# Patient Record
Sex: FEMALE | Race: WHITE | NOT HISPANIC OR LATINO | URBAN - METROPOLITAN AREA
[De-identification: names, ages, dates, MRNs, and addresses within clinical notes are randomized per-mention and may not be internally consistent; named-entity substitution may affect disease eponyms.]

---

## 2018-03-28 ENCOUNTER — OUTPATIENT (OUTPATIENT)
Dept: OUTPATIENT SERVICES | Facility: HOSPITAL | Age: 58
LOS: 1 days | End: 2018-03-28
Payer: COMMERCIAL

## 2018-03-28 DIAGNOSIS — Z01.818 ENCOUNTER FOR OTHER PREPROCEDURAL EXAMINATION: ICD-10-CM

## 2018-03-28 DIAGNOSIS — Z98.89 OTHER SPECIFIED POSTPROCEDURAL STATES: Chronic | ICD-10-CM

## 2018-03-28 DIAGNOSIS — Z90.710 ACQUIRED ABSENCE OF BOTH CERVIX AND UTERUS: Chronic | ICD-10-CM

## 2018-03-28 LAB
ANION GAP SERPL CALC-SCNC: 13 MMOL/L — SIGNIFICANT CHANGE UP (ref 5–17)
BASOPHILS # BLD AUTO: 0 K/UL — SIGNIFICANT CHANGE UP (ref 0–0.2)
BASOPHILS NFR BLD AUTO: 0.8 % — SIGNIFICANT CHANGE UP (ref 0–2)
BUN SERPL-MCNC: 11 MG/DL — SIGNIFICANT CHANGE UP (ref 8–20)
CALCIUM SERPL-MCNC: 9.5 MG/DL — SIGNIFICANT CHANGE UP (ref 8.6–10.2)
CHLORIDE SERPL-SCNC: 103 MMOL/L — SIGNIFICANT CHANGE UP (ref 98–107)
CO2 SERPL-SCNC: 24 MMOL/L — SIGNIFICANT CHANGE UP (ref 22–29)
CREAT SERPL-MCNC: 0.52 MG/DL — SIGNIFICANT CHANGE UP (ref 0.5–1.3)
EOSINOPHIL # BLD AUTO: 0.2 K/UL — SIGNIFICANT CHANGE UP (ref 0–0.5)
EOSINOPHIL NFR BLD AUTO: 3 % — SIGNIFICANT CHANGE UP (ref 0–6)
GLUCOSE SERPL-MCNC: 80 MG/DL — SIGNIFICANT CHANGE UP (ref 70–115)
HCT VFR BLD CALC: 40.4 % — SIGNIFICANT CHANGE UP (ref 37–47)
HGB BLD-MCNC: 13.6 G/DL — SIGNIFICANT CHANGE UP (ref 12–16)
LYMPHOCYTES # BLD AUTO: 2.4 K/UL — SIGNIFICANT CHANGE UP (ref 1–4.8)
LYMPHOCYTES # BLD AUTO: 40.3 % — SIGNIFICANT CHANGE UP (ref 20–55)
MCHC RBC-ENTMCNC: 30.9 PG — SIGNIFICANT CHANGE UP (ref 27–31)
MCHC RBC-ENTMCNC: 33.7 G/DL — SIGNIFICANT CHANGE UP (ref 32–36)
MCV RBC AUTO: 91.8 FL — SIGNIFICANT CHANGE UP (ref 81–99)
MONOCYTES # BLD AUTO: 0.5 K/UL — SIGNIFICANT CHANGE UP (ref 0–0.8)
MONOCYTES NFR BLD AUTO: 8.6 % — SIGNIFICANT CHANGE UP (ref 3–10)
NEUTROPHILS # BLD AUTO: 2.8 K/UL — SIGNIFICANT CHANGE UP (ref 1.8–8)
NEUTROPHILS NFR BLD AUTO: 47.1 % — SIGNIFICANT CHANGE UP (ref 37–73)
PLATELET # BLD AUTO: 271 K/UL — SIGNIFICANT CHANGE UP (ref 150–400)
POTASSIUM SERPL-MCNC: 4.1 MMOL/L — SIGNIFICANT CHANGE UP (ref 3.5–5.3)
POTASSIUM SERPL-SCNC: 4.1 MMOL/L — SIGNIFICANT CHANGE UP (ref 3.5–5.3)
RBC # BLD: 4.4 M/UL — SIGNIFICANT CHANGE UP (ref 4.4–5.2)
RBC # FLD: 13.2 % — SIGNIFICANT CHANGE UP (ref 11–15.6)
SODIUM SERPL-SCNC: 140 MMOL/L — SIGNIFICANT CHANGE UP (ref 135–145)
WBC # BLD: 6 K/UL — SIGNIFICANT CHANGE UP (ref 4.8–10.8)
WBC # FLD AUTO: 6 K/UL — SIGNIFICANT CHANGE UP (ref 4.8–10.8)

## 2018-03-28 PROCEDURE — 36415 COLL VENOUS BLD VENIPUNCTURE: CPT

## 2018-03-28 PROCEDURE — 93010 ELECTROCARDIOGRAM REPORT: CPT

## 2018-03-28 PROCEDURE — 85027 COMPLETE CBC AUTOMATED: CPT

## 2018-03-28 PROCEDURE — 93005 ELECTROCARDIOGRAM TRACING: CPT

## 2018-03-28 PROCEDURE — 80048 BASIC METABOLIC PNL TOTAL CA: CPT

## 2018-03-28 PROCEDURE — G0463: CPT

## 2018-04-05 ENCOUNTER — RESULT REVIEW (OUTPATIENT)
Age: 58
End: 2018-04-05

## 2018-09-17 ENCOUNTER — APPOINTMENT (OUTPATIENT)
Dept: PULMONOLOGY | Facility: CLINIC | Age: 58
End: 2018-09-17
Payer: COMMERCIAL

## 2018-09-17 VITALS
HEIGHT: 61 IN | SYSTOLIC BLOOD PRESSURE: 124 MMHG | BODY MASS INDEX: 40.59 KG/M2 | WEIGHT: 215 LBS | DIASTOLIC BLOOD PRESSURE: 80 MMHG | OXYGEN SATURATION: 96 % | RESPIRATION RATE: 16 BRPM | HEART RATE: 83 BPM

## 2018-09-17 DIAGNOSIS — Z87.891 PERSONAL HISTORY OF NICOTINE DEPENDENCE: ICD-10-CM

## 2018-09-17 PROCEDURE — 99205 OFFICE O/P NEW HI 60 MIN: CPT

## 2018-09-27 ENCOUNTER — OUTPATIENT (OUTPATIENT)
Dept: OUTPATIENT SERVICES | Facility: HOSPITAL | Age: 58
LOS: 1 days | End: 2018-09-27
Payer: COMMERCIAL

## 2018-09-27 DIAGNOSIS — Z90.710 ACQUIRED ABSENCE OF BOTH CERVIX AND UTERUS: Chronic | ICD-10-CM

## 2018-09-27 DIAGNOSIS — G47.33 OBSTRUCTIVE SLEEP APNEA (ADULT) (PEDIATRIC): ICD-10-CM

## 2018-09-27 DIAGNOSIS — Z98.89 OTHER SPECIFIED POSTPROCEDURAL STATES: Chronic | ICD-10-CM

## 2018-09-27 PROCEDURE — 95806 SLEEP STUDY UNATT&RESP EFFT: CPT

## 2018-09-27 PROCEDURE — 95806 SLEEP STUDY UNATT&RESP EFFT: CPT | Mod: 26

## 2018-09-27 PROCEDURE — G0399: CPT

## 2018-10-09 ENCOUNTER — APPOINTMENT (OUTPATIENT)
Dept: PULMONOLOGY | Facility: CLINIC | Age: 58
End: 2018-10-09
Payer: COMMERCIAL

## 2018-10-09 VITALS — SYSTOLIC BLOOD PRESSURE: 122 MMHG | WEIGHT: 215 LBS | BODY MASS INDEX: 40.62 KG/M2 | DIASTOLIC BLOOD PRESSURE: 66 MMHG

## 2018-10-09 VITALS — HEART RATE: 68 BPM | OXYGEN SATURATION: 98 %

## 2018-10-09 VITALS — RESPIRATION RATE: 16 BRPM

## 2018-10-09 PROCEDURE — 99214 OFFICE O/P EST MOD 30 MIN: CPT

## 2018-10-09 RX ORDER — DOXYLAMINE SUCCINATE 25 MG
TABLET ORAL
Refills: 0 | Status: ACTIVE | COMMUNITY

## 2019-01-17 ENCOUNTER — APPOINTMENT (OUTPATIENT)
Dept: PULMONOLOGY | Facility: CLINIC | Age: 59
End: 2019-01-17
Payer: COMMERCIAL

## 2019-01-17 VITALS — WEIGHT: 216 LBS | HEIGHT: 61 IN | BODY MASS INDEX: 40.78 KG/M2

## 2019-01-17 VITALS — OXYGEN SATURATION: 98 % | DIASTOLIC BLOOD PRESSURE: 78 MMHG | SYSTOLIC BLOOD PRESSURE: 122 MMHG | HEART RATE: 68 BPM

## 2019-01-17 PROCEDURE — 99214 OFFICE O/P EST MOD 30 MIN: CPT

## 2019-01-17 NOTE — ASSESSMENT
[FreeTextEntry1] : Severe obstructive sleep apnea with excellent compliance and benefit from CPAP. Supplies renewed. Followup one year.

## 2019-01-17 NOTE — PHYSICAL EXAM
[Normal Conjunctiva] : the conjunctiva exhibited no abnormalities [Eyelids - No Xanthelasma] : the eyelids demonstrated no xanthelasmas [IV] : IV [FreeTextEntry1] : Facial asymmetry with palatal and tongue asymmetry as well. [Neck Appearance] : the appearance of the neck was normal [Neck Cervical Mass (___cm)] : no neck mass was observed [Jugular Venous Distention Increased] : there was no jugular-venous distention [Thyroid Diffuse Enlargement] : the thyroid was not enlarged [Thyroid Nodule] : there were no palpable thyroid nodules [Heart Rate And Rhythm] : heart rate and rhythm were normal [Heart Sounds] : normal S1 and S2 [Murmurs] : no murmurs present [Respiration, Rhythm And Depth] : normal respiratory rhythm and effort [Exaggerated Use Of Accessory Muscles For Inspiration] : no accessory muscle use [Auscultation Breath Sounds / Voice Sounds] : lungs were clear to auscultation bilaterally [Abdomen Soft] : soft [Abdomen Tenderness] : non-tender [Abdomen Mass (___ Cm)] : no abdominal mass palpated [Abnormal Walk] : normal gait [Gait - Sufficient For Exercise Testing] : the gait was sufficient for exercise testing [Nail Clubbing] : no clubbing of the fingernails [Cyanosis, Localized] : no localized cyanosis [Petechial Hemorrhages (___cm)] : no petechial hemorrhages [Deep Tendon Reflexes (DTR)] : deep tendon reflexes were 2+ and symmetric [Sensation] : the sensory exam was normal to light touch and pinprick [No Focal Deficits] : no focal deficits [Oriented To Time, Place, And Person] : oriented to person, place, and time [Impaired Insight] : insight and judgment were intact [Affect] : the affect was normal [Skin Color & Pigmentation] : normal skin color and pigmentation [Skin Turgor] : normal skin turgor [] : no rash

## 2019-01-17 NOTE — HISTORY OF PRESENT ILLNESS
[FreeTextEntry1] : Patient got her new machine. She is faithful with it and reports continuing to do well with improved alertness. Compliance is excellent. She does use greater than 4 hours on 97% of the night. She is averaging 8-1/4 hours of use per night. Residual AHI of 0.5.

## 2019-06-11 ENCOUNTER — EMERGENCY (EMERGENCY)
Facility: HOSPITAL | Age: 59
LOS: 1 days | Discharge: HOME | End: 2019-06-11
Attending: EMERGENCY MEDICINE
Payer: COMMERCIAL

## 2019-06-11 VITALS
HEART RATE: 60 BPM | RESPIRATION RATE: 20 BRPM | DIASTOLIC BLOOD PRESSURE: 72 MMHG | SYSTOLIC BLOOD PRESSURE: 117 MMHG | OXYGEN SATURATION: 94 % | TEMPERATURE: 97 F

## 2019-06-11 VITALS
OXYGEN SATURATION: 99 % | RESPIRATION RATE: 20 BRPM | HEART RATE: 88 BPM | DIASTOLIC BLOOD PRESSURE: 81 MMHG | SYSTOLIC BLOOD PRESSURE: 130 MMHG | HEIGHT: 62 IN | WEIGHT: 195.11 LBS | TEMPERATURE: 98 F

## 2019-06-11 DIAGNOSIS — Z98.89 OTHER SPECIFIED POSTPROCEDURAL STATES: Chronic | ICD-10-CM

## 2019-06-11 DIAGNOSIS — Z90.710 ACQUIRED ABSENCE OF BOTH CERVIX AND UTERUS: Chronic | ICD-10-CM

## 2019-06-11 LAB
ALBUMIN SERPL ELPH-MCNC: 4 G/DL — SIGNIFICANT CHANGE UP (ref 3.3–5.2)
ALP SERPL-CCNC: 98 U/L — SIGNIFICANT CHANGE UP (ref 40–120)
ALT FLD-CCNC: 15 U/L — SIGNIFICANT CHANGE UP
ANION GAP SERPL CALC-SCNC: 13 MMOL/L — SIGNIFICANT CHANGE UP (ref 5–17)
APTT BLD: 28.3 SEC — SIGNIFICANT CHANGE UP (ref 27.5–36.3)
AST SERPL-CCNC: 18 U/L — SIGNIFICANT CHANGE UP
BASOPHILS # BLD AUTO: 0 K/UL — SIGNIFICANT CHANGE UP (ref 0–0.2)
BASOPHILS NFR BLD AUTO: 0.8 % — SIGNIFICANT CHANGE UP (ref 0–2)
BILIRUB SERPL-MCNC: 0.3 MG/DL — LOW (ref 0.4–2)
BLD GP AB SCN SERPL QL: SIGNIFICANT CHANGE UP
BUN SERPL-MCNC: 8 MG/DL — SIGNIFICANT CHANGE UP (ref 8–20)
CALCIUM SERPL-MCNC: 9 MG/DL — SIGNIFICANT CHANGE UP (ref 8.6–10.2)
CHLORIDE SERPL-SCNC: 107 MMOL/L — SIGNIFICANT CHANGE UP (ref 98–107)
CO2 SERPL-SCNC: 22 MMOL/L — SIGNIFICANT CHANGE UP (ref 22–29)
CREAT SERPL-MCNC: 0.59 MG/DL — SIGNIFICANT CHANGE UP (ref 0.5–1.3)
EOSINOPHIL # BLD AUTO: 0.1 K/UL — SIGNIFICANT CHANGE UP (ref 0–0.5)
EOSINOPHIL NFR BLD AUTO: 2.1 % — SIGNIFICANT CHANGE UP (ref 0–6)
GLUCOSE SERPL-MCNC: 95 MG/DL — SIGNIFICANT CHANGE UP (ref 70–115)
HCT VFR BLD CALC: 39.5 % — SIGNIFICANT CHANGE UP (ref 37–47)
HGB BLD-MCNC: 13 G/DL — SIGNIFICANT CHANGE UP (ref 12–16)
INR BLD: 1.16 RATIO — SIGNIFICANT CHANGE UP (ref 0.88–1.16)
LIDOCAIN IGE QN: 33 U/L — SIGNIFICANT CHANGE UP (ref 22–51)
LYMPHOCYTES # BLD AUTO: 2.7 K/UL — SIGNIFICANT CHANGE UP (ref 1–4.8)
LYMPHOCYTES # BLD AUTO: 41.1 % — SIGNIFICANT CHANGE UP (ref 20–55)
MCHC RBC-ENTMCNC: 29.9 PG — SIGNIFICANT CHANGE UP (ref 27–31)
MCHC RBC-ENTMCNC: 32.9 G/DL — SIGNIFICANT CHANGE UP (ref 32–36)
MCV RBC AUTO: 90.8 FL — SIGNIFICANT CHANGE UP (ref 81–99)
MONOCYTES # BLD AUTO: 0.6 K/UL — SIGNIFICANT CHANGE UP (ref 0–0.8)
MONOCYTES NFR BLD AUTO: 9.3 % — SIGNIFICANT CHANGE UP (ref 3–10)
NEUTROPHILS # BLD AUTO: 3 K/UL — SIGNIFICANT CHANGE UP (ref 1.8–8)
NEUTROPHILS NFR BLD AUTO: 46.5 % — SIGNIFICANT CHANGE UP (ref 37–73)
PLATELET # BLD AUTO: 283 K/UL — SIGNIFICANT CHANGE UP (ref 150–400)
POTASSIUM SERPL-MCNC: 3.8 MMOL/L — SIGNIFICANT CHANGE UP (ref 3.5–5.3)
POTASSIUM SERPL-SCNC: 3.8 MMOL/L — SIGNIFICANT CHANGE UP (ref 3.5–5.3)
PROT SERPL-MCNC: 7.2 G/DL — SIGNIFICANT CHANGE UP (ref 6.6–8.7)
PROTHROM AB SERPL-ACNC: 13.4 SEC — HIGH (ref 10–12.9)
RBC # BLD: 4.35 M/UL — LOW (ref 4.4–5.2)
RBC # FLD: 13.5 % — SIGNIFICANT CHANGE UP (ref 11–15.6)
SODIUM SERPL-SCNC: 142 MMOL/L — SIGNIFICANT CHANGE UP (ref 135–145)
TYPE + AB SCN PNL BLD: SIGNIFICANT CHANGE UP
WBC # BLD: 6.6 K/UL — SIGNIFICANT CHANGE UP (ref 4.8–10.8)
WBC # FLD AUTO: 6.6 K/UL — SIGNIFICANT CHANGE UP (ref 4.8–10.8)

## 2019-06-11 PROCEDURE — 85027 COMPLETE CBC AUTOMATED: CPT

## 2019-06-11 PROCEDURE — 99284 EMERGENCY DEPT VISIT MOD MDM: CPT | Mod: 25

## 2019-06-11 PROCEDURE — 86901 BLOOD TYPING SEROLOGIC RH(D): CPT

## 2019-06-11 PROCEDURE — 85610 PROTHROMBIN TIME: CPT

## 2019-06-11 PROCEDURE — 80053 COMPREHEN METABOLIC PANEL: CPT

## 2019-06-11 PROCEDURE — 96374 THER/PROPH/DIAG INJ IV PUSH: CPT | Mod: XU

## 2019-06-11 PROCEDURE — 86850 RBC ANTIBODY SCREEN: CPT

## 2019-06-11 PROCEDURE — 74177 CT ABD & PELVIS W/CONTRAST: CPT | Mod: 26

## 2019-06-11 PROCEDURE — 36415 COLL VENOUS BLD VENIPUNCTURE: CPT

## 2019-06-11 PROCEDURE — 85730 THROMBOPLASTIN TIME PARTIAL: CPT

## 2019-06-11 PROCEDURE — 74177 CT ABD & PELVIS W/CONTRAST: CPT

## 2019-06-11 PROCEDURE — 86900 BLOOD TYPING SEROLOGIC ABO: CPT

## 2019-06-11 PROCEDURE — 83690 ASSAY OF LIPASE: CPT

## 2019-06-11 RX ORDER — SODIUM CHLORIDE 9 MG/ML
1000 INJECTION INTRAMUSCULAR; INTRAVENOUS; SUBCUTANEOUS ONCE
Refills: 0 | Status: COMPLETED | OUTPATIENT
Start: 2019-06-11 | End: 2019-06-11

## 2019-06-11 RX ORDER — KETOROLAC TROMETHAMINE 30 MG/ML
15 SYRINGE (ML) INJECTION ONCE
Refills: 0 | Status: DISCONTINUED | OUTPATIENT
Start: 2019-06-11 | End: 2019-06-11

## 2019-06-11 RX ADMIN — SODIUM CHLORIDE 1000 MILLILITER(S): 9 INJECTION INTRAMUSCULAR; INTRAVENOUS; SUBCUTANEOUS at 04:12

## 2019-06-11 RX ADMIN — Medication 15 MILLIGRAM(S): at 04:12

## 2019-06-11 NOTE — ED ADULT NURSE NOTE - NSIMPLEMENTINTERV_GEN_ALL_ED
Implemented All Universal Safety Interventions:  [place] to call system. Call bell, personal items and telephone within reach. Instruct patient to call for assistance. Room bathroom lighting operational. Non-slip footwear when patient is off stretcher. Physically safe environment: no spills, clutter or unnecessary equipment. Stretcher in lowest position, wheels locked, appropriate side rails in place.

## 2019-06-11 NOTE — ED ADULT NURSE NOTE - PMH
Acoustic neuroma  28 years ago  Diverticulitis    Hypothyroid    Sleep apnea with use of continuous positive airway pressure (CPAP)

## 2019-06-11 NOTE — ED PROVIDER NOTE - PHYSICAL EXAMINATION
General: In NAD, non-toxic appearing; well nourished/developed.  Skin: No rashes or lesions. Warm, dry, color normal for race. No cyanosis or jaundice appreciated.   Cardio: No lifts, heaves, visible pulsations. No thrills. Rate and rhythm regular, S1 & S2 clear. No audible murmur, gallop, or rub.  PV: No swelling.  Resp: Normal AP to lateral diameter, symmetrical excursion b/l. Breath sounds vesicular, symmetrical and without rales, rhonchi or wheezing b/l.  Abd: Non-distended. +Scars. No visible pulsations. BS normoactive x4. Soft, mild TTP of LLQ. Reducible hernia above umbilicus, moderate TTP. No rebound, guarding. No McBurney's point tenderness. No CVA tenderness.  Neuro: A&Ox3.

## 2019-06-11 NOTE — ED PROVIDER NOTE - CLINICAL SUMMARY MEDICAL DECISION MAKING FREE TEXT BOX
60 yo female PMHx diverticulitis requiring intestine resection 2014, abdominoplasty, hypothyroid, c/o abdominal pain x14 hours. Reducible hernia above umbilicus on exam. Mild TTP of LLQ.   Plan:   - Labs  - CT  - Pain control

## 2019-06-11 NOTE — ED ADULT NURSE NOTE - PSH
H/O abdominoplasty    H/O: hysterectomy  (2004)  History of facial surgery  1986- facial reconstruction after excision of acoustic neuroma  S/P colectomy  10/27/2014  S/P excision of acoustic neuroma  1986

## 2019-06-11 NOTE — ED ADULT NURSE NOTE - CHIEF COMPLAINT QUOTE
"I have pain on the left side of my stomach" c/o LLQ abd pain starting this morning worsening while laying down. Hx diverticulitis. denies n/v denies fevers denies cp denies sob. RR even and unlabored, able to ambulate with steady gait noted

## 2019-06-11 NOTE — ED PROVIDER NOTE - ATTENDING CONTRIBUTION TO CARE
I personally saw the patient with the PA, and completed the key components of the history and physical exam. I then discussed the management plan with the PA.   gen in nad resp clear cardiac no murmur abd mild ttp llq no g/r/r no cvat bs nml neuro intact

## 2019-06-11 NOTE — ED ADULT NURSE NOTE - OBJECTIVE STATEMENT
58yo female c/o "lower" abdominal pain since 10am. pt denies any n/v/d, fevers, chills, urinary symptoms. pt did not take any meds prior to coming to ED. pt states "I think my diverticulitis is acting up" abd soft, tender, non distended, + bs x 4. skin warm and dry, color appropriate for race. no ble edema

## 2019-06-11 NOTE — ED PROVIDER NOTE - OBJECTIVE STATEMENT
58 yo female PMHx diverticulitis requiring intestine resection 2014, abdominoplasty, hypothyroid, c/o abdominal pain x14 hours. States pain which points to LLQ began in morning, gradually worsened throughout day. Described as sharp, constant 6/10. Worsened with laying down. Additionally, patient has known hernia above umbilicus which has caused increased pain today. Last bowel movement yesterday and normal. Did not eat dinner this evening. Has recent EGD/colonoscopy (within 2-3 years), 2 polyps removed. Did not self medicate PTA.   Denies kidney stones, fever/chills, headache, dizziness, chest pain, cough, SOB, n/v/d/c, hematochezia, urinary sxms.  PCP: Myla  Surgeon: Josh

## 2021-03-15 NOTE — ED PROVIDER NOTE - PMH
Acoustic neuroma  28 years ago  Diverticulitis    Hypothyroid    Sleep apnea with use of continuous positive airway pressure (CPAP) 31.9

## 2021-09-17 ENCOUNTER — APPOINTMENT (OUTPATIENT)
Dept: SURGERY | Facility: CLINIC | Age: 61
End: 2021-09-17
Payer: COMMERCIAL

## 2021-09-17 VITALS
RESPIRATION RATE: 15 BRPM | DIASTOLIC BLOOD PRESSURE: 84 MMHG | HEART RATE: 75 BPM | BODY MASS INDEX: 45.88 KG/M2 | WEIGHT: 243 LBS | OXYGEN SATURATION: 98 % | HEIGHT: 61 IN | TEMPERATURE: 96.9 F | SYSTOLIC BLOOD PRESSURE: 149 MMHG

## 2021-09-17 VITALS — SYSTOLIC BLOOD PRESSURE: 136 MMHG | DIASTOLIC BLOOD PRESSURE: 86 MMHG

## 2021-09-17 PROCEDURE — 99204 OFFICE O/P NEW MOD 45 MIN: CPT

## 2021-09-17 NOTE — HISTORY OF PRESENT ILLNESS
[de-identified] : The patient comes to the office in consultation by Dr. Keith Ying for evaluation of a recurrent incisional hernia.  The patient reports that she underwent a mesh repair of an incisional hernia in Jan 2021, followed by the another repair in June of 2021 with mesh.  She was now found to have yet another hernia in the left abdominal wall.  The patient reports that she is without nausea or vomit, but reports a burning pain with exertion and bending forward.  She states she had a prior sleeve gastrectomy in 2015 and lost about 40 pounds, but has gained all the weight back plus more.

## 2021-09-17 NOTE — DATA REVIEWED
[FreeTextEntry1] : Indepedent review of the abd/pel CT scan reveals the presence of mesh at an umbilical hernia repair site, superior and to the left of this is again noted a hernia that has grown in size to 2.1 by 2.7 cm in size, previously 1.5 by 1.6 cm in size, patient is status post sleeve gastrectomy

## 2021-09-17 NOTE — CONSULT LETTER
[Dear  ___] : Dear  [unfilled], [Consult Letter:] : I had the pleasure of evaluating your patient, [unfilled]. [Please see my note below.] : Please see my note below. [Consult Closing:] : Thank you very much for allowing me to participate in the care of this patient.  If you have any questions, please do not hesitate to contact me. [Sincerely,] : Sincerely, [FreeTextEntry3] : Misael Ortiz MD, FACS\par  \par Department of Surgery\par [facility]\par [facility]\par

## 2021-09-17 NOTE — ASSESSMENT
[FreeTextEntry1] : The patient is a morbidly obese 61 year old female with a recurrent incisional hernia.  The patient at this point has been advised that weight loss will be paramount to help reduce the potential for further recurrence.  The patient has been advised that weight loss will be an important adjunct to help potentially reduce the risks of infection, hernia recurrence, and chronic post operative pain associated with surgery by potentially reducing the tension along the abdominal wall.  The patient was advised that she would likely benefit from a bariatric surgical consultation given her failure with sleeve gastrectomy.  She might benefit from conversion to a Denice en Y bypass.  She is open to consultation with the bariatric service.  She will return for follow up and with the CT scan films for my review.   A total of 60 minutes was spent coordinating the care of the patient.

## 2021-10-04 ENCOUNTER — APPOINTMENT (OUTPATIENT)
Dept: SURGERY | Facility: CLINIC | Age: 61
End: 2021-10-04
Payer: COMMERCIAL

## 2021-10-04 VITALS
TEMPERATURE: 97.8 F | SYSTOLIC BLOOD PRESSURE: 145 MMHG | DIASTOLIC BLOOD PRESSURE: 85 MMHG | RESPIRATION RATE: 16 BRPM | HEIGHT: 61 IN | BODY MASS INDEX: 44.98 KG/M2 | OXYGEN SATURATION: 97 % | WEIGHT: 238.25 LBS | HEART RATE: 80 BPM

## 2021-10-04 PROCEDURE — 99214 OFFICE O/P EST MOD 30 MIN: CPT

## 2021-10-04 NOTE — PHYSICAL EXAM
[JVD] : no jugular venous distention  [Purpura] : no purpura  [Petechiae] : no petechiae [Skin Ulcer] : no ulcer [Skin Induration] : no induration [Alert] : alert [Oriented to Person] : oriented to person [Oriented to Place] : oriented to place [Oriented to Time] : oriented to time [Calm] : calm [de-identified] : non toxic, in no acute distress  [de-identified] : NC/AT PERRL EOMI no scleral icterus  [de-identified] : trachea midline, no gross mass  [de-identified] : no audible wheezing or stridor  [de-identified] : morbidly obese, no localizing tenderness, no guarding, no rebound, no masses  [de-identified] : FROM of the extremities with no gross deformity or angulation, there remains a soft reducible incisional hernia in the left mid abdomen, there is significant scarring noted obscuring estimation of the fascial defect [de-identified] : hypertrophic scarring of the midline consistent with numerous prior procedures  [de-identified] : mood is calm

## 2021-10-04 NOTE — ASSESSMENT
[FreeTextEntry1] : The patient is a morbidly obese 61 year old female with a recurrent incisional hernia.  The patient at this point was advised that she will still best be served with weight loss prior to surgery.  She is seeing Dr. Glass in consultation today for possible bariatric surgery as an adjunct for weight loss.  Regarding the hernia, we will approach this once she has lost enough weight to reduce the potential risk for further recurrence. She will follow up in 3 months time or sooner should any problems or issues arise.  A total of 30 minutes was spent coordinating the care of the patient.

## 2021-10-04 NOTE — DATA REVIEWED
[FreeTextEntry1] : independent review of the abd/pel CT scan reveals a recurrent hernia containing fat above and to the left of the previously repaired hernia defect.  The defect measures roughly 3 by 4 cm in overall diameter

## 2021-10-18 DIAGNOSIS — Z87.898 PERSONAL HISTORY OF OTHER SPECIFIED CONDITIONS: ICD-10-CM

## 2021-10-18 DIAGNOSIS — R06.83 SNORING: ICD-10-CM

## 2021-10-18 DIAGNOSIS — Z83.3 FAMILY HISTORY OF DIABETES MELLITUS: ICD-10-CM

## 2021-10-18 DIAGNOSIS — Z87.42 PERSONAL HISTORY OF OTHER DISEASES OF THE FEMALE GENITAL TRACT: ICD-10-CM

## 2021-10-18 DIAGNOSIS — M19.90 UNSPECIFIED OSTEOARTHRITIS, UNSPECIFIED SITE: ICD-10-CM

## 2021-10-18 DIAGNOSIS — R68.82 DECREASED LIBIDO: ICD-10-CM

## 2021-10-18 DIAGNOSIS — Z56.0 UNEMPLOYMENT, UNSPECIFIED: ICD-10-CM

## 2021-10-18 DIAGNOSIS — K80.20 CALCULUS OF GALLBLADDER W/OUT CHOLECYSTITIS W/OUT OBSTRUCTION: ICD-10-CM

## 2021-10-18 SDOH — ECONOMIC STABILITY - INCOME SECURITY: UNEMPLOYMENT, UNSPECIFIED: Z56.0

## 2021-10-19 ENCOUNTER — LABORATORY RESULT (OUTPATIENT)
Age: 61
End: 2021-10-19

## 2021-10-22 LAB
25(OH)D3 SERPL-MCNC: 35 NG/ML
ALBUMIN SERPL ELPH-MCNC: 4 G/DL
ALP BLD-CCNC: 111 U/L
ALT SERPL-CCNC: 61 U/L
ANION GAP SERPL CALC-SCNC: 14 MMOL/L
APPEARANCE: CLEAR
APTT BLD: 32.7 SEC
AST SERPL-CCNC: 38 U/L
BACTERIA: NEGATIVE
BASOPHILS # BLD AUTO: NORMAL
BASOPHILS NFR BLD AUTO: NORMAL
BILIRUB SERPL-MCNC: 0.2 MG/DL
BILIRUBIN URINE: NEGATIVE
BLOOD URINE: NEGATIVE
BUN SERPL-MCNC: 10 MG/DL
CALCIUM SERPL-MCNC: 9.6 MG/DL
CALCIUM SERPL-MCNC: 9.6 MG/DL
CHLORIDE SERPL-SCNC: 107 MMOL/L
CHOLEST SERPL-MCNC: 183 MG/DL
CO2 SERPL-SCNC: 21 MMOL/L
COLOR: NORMAL
CREAT SERPL-MCNC: 0.61 MG/DL
EOSINOPHIL # BLD AUTO: NORMAL
EOSINOPHIL NFR BLD AUTO: NORMAL
ESTIMATED AVERAGE GLUCOSE: NORMAL MG/DL
FOLATE SERPL-MCNC: >20 NG/ML
GLUCOSE QUALITATIVE U: NEGATIVE
GLUCOSE SERPL-MCNC: 76 MG/DL
H PYLORI AB SER-ACNC: <5 UNITS
H PYLORI IGA SER-ACNC: 49.8 UNITS
HBA1C MFR BLD HPLC: NORMAL
HCG SERPL QL: NEGATIVE
HCT VFR BLD CALC: NORMAL
HDLC SERPL-MCNC: 49 MG/DL
HGB BLD-MCNC: NORMAL
HYALINE CASTS: 0 /LPF
IMM GRANULOCYTES NFR BLD AUTO: NORMAL
INR PPP: 1.07 RATIO
IRON SATN MFR SERPL: 28 %
IRON SERPL-MCNC: 82 UG/DL
KETONES URINE: NEGATIVE
LDLC SERPL CALC-MCNC: 114 MG/DL
LEUKOCYTE ESTERASE URINE: NEGATIVE
LYMPHOCYTES # BLD AUTO: NORMAL
LYMPHOCYTES NFR BLD AUTO: NORMAL
MAN DIFF?: NORMAL
MCHC RBC-ENTMCNC: NORMAL
MCHC RBC-ENTMCNC: NORMAL
MCV RBC AUTO: NORMAL
MICROSCOPIC-UA: NORMAL
MONOCYTES # BLD AUTO: NORMAL
MONOCYTES NFR BLD AUTO: NORMAL
NEUTROPHILS # BLD AUTO: NORMAL
NEUTROPHILS NFR BLD AUTO: NORMAL
NITRITE URINE: NEGATIVE
NONHDLC SERPL-MCNC: 134 MG/DL
PAPP-A SERPL-ACNC: <1 MIU/ML
PARATHYROID HORMONE INTACT: 56 PG/ML
PH URINE: 6.5
PLATELET # BLD AUTO: NORMAL
POTASSIUM SERPL-SCNC: 4.4 MMOL/L
PREALB SERPL NEPH-MCNC: 15 MG/DL
PROT SERPL-MCNC: 6.8 G/DL
PROTEIN URINE: NEGATIVE
PT BLD: 12.6 SEC
RBC # BLD: NORMAL
RBC # FLD: NORMAL
RED BLOOD CELLS URINE: 0 /HPF
SODIUM SERPL-SCNC: 141 MMOL/L
SPECIFIC GRAVITY URINE: 1.01
SQUAMOUS EPITHELIAL CELLS: 0 /HPF
TIBC SERPL-MCNC: 297 UG/DL
TRIGL SERPL-MCNC: 98 MG/DL
TSH SERPL-ACNC: 0.56 UIU/ML
UIBC SERPL-MCNC: 214 UG/DL
UROBILINOGEN URINE: NORMAL
VIT B12 SERPL-MCNC: 563 PG/ML
WBC # FLD AUTO: NORMAL
WHITE BLOOD CELLS URINE: 0 /HPF

## 2021-10-25 LAB
A-TOCOPHEROL VIT E SERPL-MCNC: 11.4 MG/L
BETA+GAMMA TOCOPHEROL SERPL-MCNC: 0.7 MG/L
VIT A SERPL-MCNC: 15.5 UG/DL
VIT B1 SERPL-MCNC: 117.3 NMOL/L

## 2021-10-26 NOTE — HISTORY OF PRESENT ILLNESS
[de-identified] : 61F presents today to discuss her weight loss options. History of TARA and hypothyroidism, as well as previous sleeve gastrectomy for which she lost ~40 lbs but then subsequently regained the weight back. She has had multiple incisional hernia repairs by Dr. Finkelstein complicated by recurrence. LAst seen by Dr. Ortiz, who recommended pre-repair weight loss in order to optimize her outcomes. \par She states that she never lost significant weight after her previous sleeve, and feels no restriction when she eats. \par Her hernia causes significant pain and she would like to have it repaired as soon as possible. \par No recent illnesses. No GERD.

## 2021-10-26 NOTE — REASON FOR VISIT
[Initial Consult] : an initial consult for [Morbid Obesity (BMI>40)] : morbid obesity (bmi>40) [Family Member] : family member

## 2021-10-26 NOTE — REVIEW OF SYSTEMS
[Fever] : no fever [Chills] : no chills [Eye Pain] : no eye pain [Red Eyes] : eyes not red [Dysphagia] : no dysphagia [Loss of Hearing] : no loss of hearing [Odynophagia] : no odynophagia [Mucosal Pain] : no mucosal pain [Chest Pain] : no chest pain [Palpitations] : no palpitations [Shortness Of Breath] : no shortness of breath [Wheezing] : no wheezing [Abdominal Pain] : abdominal pain [Vomiting] : no vomiting [Reflux/Heartburn] : no reflux/ heartburn [Hernia] : no hernia [Dysuria] : no dysuria [Incontinence] : no incontinence [Joint Pain] : no joint pain [Joint Stiffness] : no joint stiffness [Skin Rash] : no skin rash [Skin Wound] : no skin wound [Confused] : no confusion [Dizziness] : no dizziness [Suicidal] : not suicidal [Insomnia] : no insomnia [Proptosis] : no proptosis [Hot Flashes] : no hot flashes [Easy Bleeding] : no tendency for easy bleeding [Easy Bruising] : no tendency for easy bruising

## 2021-10-26 NOTE — ASSESSMENT
[FreeTextEntry1] : 61F with TARA, multiple recurrent hernias, and morbid obesity s/p previous sleeve gastrectomy c/b weight regain. Here for evaluation for possible revisional bariatric surgery in order to lose sufficient weight prior to hernia surgery. Although the patient would likely benefit from revision, she is hesitant to go through the required preoperative testing and evaluations. In addition to her bariatric workup, she would likely need ~6months in order to lose sufficient weight to undergo hernia surgery, something she does not want to do. However, we will initiate the workup.

## 2021-10-27 ENCOUNTER — APPOINTMENT (OUTPATIENT)
Dept: SURGERY | Facility: CLINIC | Age: 61
End: 2021-10-27
Payer: COMMERCIAL

## 2021-10-27 VITALS
DIASTOLIC BLOOD PRESSURE: 78 MMHG | TEMPERATURE: 96.7 F | BODY MASS INDEX: 45.61 KG/M2 | HEART RATE: 72 BPM | HEIGHT: 61 IN | SYSTOLIC BLOOD PRESSURE: 120 MMHG | OXYGEN SATURATION: 98 % | WEIGHT: 241.6 LBS | RESPIRATION RATE: 16 BRPM

## 2021-10-27 LAB — ZINC SERPL-MCNC: 63 UG/DL

## 2021-10-27 PROCEDURE — 99213 OFFICE O/P EST LOW 20 MIN: CPT

## 2021-10-27 NOTE — ASSESSMENT
[FreeTextEntry1] : Pt’s current wt is 241.9#, which is above IBW range and BMI is indicative of morbid obese wt status. Pt presents with wt gain of 3.5# since last appointment on 10/04/2021. Pt reports she has improved her eating habits by increasing vegetable intake and using less oil to cook. Pt has not improved her physical activity due to pain in her back and knees. Pt states she does snack throughout the day on chips and cookies. Pt educated on eating high protein, low carbohydrate meals at least 3x per day to feel satisfied and avoid snacking. Pt agreeable to increasing protein intake at meals and decreasing carbohydrate intake. Pt, also, given education on seated exercises that are low impact. Pt verbalized understanding and agreed to increase overall physical activity. Pt expressed gratitude. Pt to schedule follow up appointment in 1 month. RD to remain available for ongoing support and encouragement.

## 2021-10-27 NOTE — HISTORY OF PRESENT ILLNESS
[de-identified] : Ms. ORTIZ CASTILLO is a 61 year-old woman who is enrolled in our healthy weight loss program, returns today to the office for a weight check and nutrition visit. \par

## 2021-11-03 LAB — MENADIONE SERPL-MCNC: 0.28 NG/ML

## 2021-11-15 ENCOUNTER — OUTPATIENT (OUTPATIENT)
Dept: OUTPATIENT SERVICES | Facility: HOSPITAL | Age: 61
LOS: 1 days | End: 2021-11-15
Payer: COMMERCIAL

## 2021-11-15 DIAGNOSIS — Z90.710 ACQUIRED ABSENCE OF BOTH CERVIX AND UTERUS: Chronic | ICD-10-CM

## 2021-11-15 DIAGNOSIS — Z98.89 OTHER SPECIFIED POSTPROCEDURAL STATES: Chronic | ICD-10-CM

## 2021-11-15 DIAGNOSIS — R13.10 DYSPHAGIA, UNSPECIFIED: ICD-10-CM

## 2021-11-15 PROCEDURE — 74246 X-RAY XM UPR GI TRC 2CNTRST: CPT

## 2021-11-15 PROCEDURE — 74246 X-RAY XM UPR GI TRC 2CNTRST: CPT | Mod: 26

## 2021-11-24 ENCOUNTER — APPOINTMENT (OUTPATIENT)
Dept: SURGERY | Facility: CLINIC | Age: 61
End: 2021-11-24
Payer: COMMERCIAL

## 2021-11-24 VITALS
HEART RATE: 67 BPM | RESPIRATION RATE: 16 BRPM | OXYGEN SATURATION: 99 % | WEIGHT: 238.38 LBS | TEMPERATURE: 97.3 F | HEIGHT: 61 IN | SYSTOLIC BLOOD PRESSURE: 123 MMHG | BODY MASS INDEX: 45.01 KG/M2 | DIASTOLIC BLOOD PRESSURE: 82 MMHG

## 2021-11-24 PROCEDURE — 99213 OFFICE O/P EST LOW 20 MIN: CPT

## 2021-11-29 NOTE — ASSESSMENT
[FreeTextEntry1] : Pt is seeking surgical intervention so the physical restriction of the surgery will allow for early satiety providing a catalyst for lifestyle change to assist with wt loss. Pt seen for 2nd visit of the 6 month medically supervised wt loss program. Pt is a 62 y/o F, whose current wt is 238.6#, which is above IBW range and BMI is indicative of morbidly obese wt status (BMI: 45.04). Pt presents with wt loss of -3# x 30 days. \par \par Breakfast: generally skips - will drink Coffee\par Lunch: Salad with cucumbers, carrots, radishes, and string beans with italian dressing\par Dinner: Ground beef with french fries\par Fluids: Water, Crystal Light\par \par Discussed the importance of a balanced, healthy diet of nourishing foods and consistent meal pattern for long-term wt loss success and health maintenance. Pt educated on consistent protein containing meals for hunger regulation. Pt reports she no longer keeps snacks in the house and is watching her portion sizes. Pt verbalized understanding and will adjust her food choices at meals.\par \par Exercise: None\par \par Pt states she has "pain in knees and back" and cannot exercise. Discussed the seated exercises that was given to her previously. Pt reports she has no tried these exercises.  \par \par Pt was informed of the importance of surgical preparation and behavior modification. It was explained that weight gain during the pre-operative period may result in insurance and/or surgeon denial. Discussed strategies to assist with behavior modification and wt loss. The pt is confident that she can make the changes necessary to support wt loss success and is motivated. \par \par PRE-OPERATIVE NUTRITION GOALS: \par - Consistent protein at meals for hunger regulation\par - Work to improve quality of food choice to maximize nourishment post-operatively\par - Increase physical activity as tolerated\par \par Will follow up with pt in 1 month. RD to remain available for ongoing support and encouragement.

## 2021-12-15 ENCOUNTER — APPOINTMENT (OUTPATIENT)
Dept: SURGERY | Facility: CLINIC | Age: 61
End: 2021-12-15
Payer: COMMERCIAL

## 2021-12-15 VITALS
RESPIRATION RATE: 16 BRPM | TEMPERATURE: 97.2 F | DIASTOLIC BLOOD PRESSURE: 79 MMHG | BODY MASS INDEX: 44.75 KG/M2 | SYSTOLIC BLOOD PRESSURE: 123 MMHG | HEIGHT: 61 IN | HEART RATE: 66 BPM | WEIGHT: 237.05 LBS | OXYGEN SATURATION: 97 %

## 2021-12-15 DIAGNOSIS — Z00.00 ENCOUNTER FOR GENERAL ADULT MEDICAL EXAMINATION W/OUT ABNORMAL FINDINGS: ICD-10-CM

## 2021-12-15 PROCEDURE — 99213 OFFICE O/P EST LOW 20 MIN: CPT

## 2021-12-15 NOTE — ASSESSMENT
[FreeTextEntry1] : Pt is seeking surgical intervention so the physical restriction of the surgery will allow for early satiety providing a catalyst for lifestyle change to assist with wt loss. Pt seen for 3rd visit of the 6 month medically supervised wt loss program. Pt is a 62 y/o F, whose current wt is 237#, which is above IBW range and BMI is indicative of morbidly obese wt status (BMI: 44.79). Pt presents with wt loss of -1# x 30 days.\par \par Breakfast: Skips, Coffee with a splash of milk and 1 splenda\par Lunch: 1/2 sandwich or Salad (cucumbers, radishes, carrots, string beans, italian dressing) with Grilled Chicken\par Dinner: Salad (cucumbers, radishes, carrots, string beans, italian dressing) with Grilled Chicken or Lentil Soup\par Snacks: None\par Fluids: Water\par \par Discussed the importance of a balanced, healthy diet of nourishing foods and consistent meal pattern for long-term wt loss success and health maintenance. Pt reports feeling better, being able to move around more, and not feeling hungry on her new diet. She states she cut out junk food, increase fruit/vegetables, and eats smaller portions at meals. Pt educated on eating 4-6 small meals per day, that are high in protein for hunger regulation. Pt verbalized understanding. Detailed information was provided on portion control, nutrient and fluid needs, satiety cues, and meal duration. Pt provided with resources to facilitate self-care habits, such as food logging and physical activity. Pt verbalized understanding and expressed gratitude.\par \par Exercise: Physically Active Yard Work\par \par Pt states she has been making sure to be more physically active for at least 30 minutes per day. Discussed starting to walk and pt amendable to adding walks in a few times per week.  \par \par Pt was informed of the importance of surgical preparation and behavior modification. It was explained that weight gain during the pre-operative period may result in insurance and/or surgeon denial. Discussed strategies to assist with behavior modification and wt loss. The pt is confident that he can make the changes necessary to support wt loss success and is motivated. \par \par PRE-OPERATIVE NUTRITION GOALS: \par - Increase physical activity as tolerated\par - Consistent protein at meals for hunger regulation\par - Food logging\par \par Will follow up with pt in 1 month. RD to remain available for ongoing support and encouragement.

## 2021-12-15 NOTE — HISTORY OF PRESENT ILLNESS
[de-identified] : Ms. ORTIZ CASTILLO is a 61 year old with history of morbid obesity, preop for bariatric surgery with Dr. Glass (date TBD). Here today for NUTRITION VISIT #3. Feels well, denies pain, fever, chills, nausea or vomiting.\par

## 2021-12-29 ENCOUNTER — TRANSCRIPTION ENCOUNTER (OUTPATIENT)
Age: 61
End: 2021-12-29

## 2022-01-03 ENCOUNTER — APPOINTMENT (OUTPATIENT)
Dept: PULMONOLOGY | Facility: CLINIC | Age: 62
End: 2022-01-03
Payer: COMMERCIAL

## 2022-01-03 VITALS
HEART RATE: 76 BPM | HEIGHT: 61 IN | WEIGHT: 240 LBS | OXYGEN SATURATION: 97 % | DIASTOLIC BLOOD PRESSURE: 70 MMHG | SYSTOLIC BLOOD PRESSURE: 122 MMHG | RESPIRATION RATE: 15 BRPM | BODY MASS INDEX: 45.31 KG/M2

## 2022-01-03 VITALS — WEIGHT: 237 LBS | BODY MASS INDEX: 44.75 KG/M2 | HEIGHT: 61 IN

## 2022-01-03 DIAGNOSIS — Z99.89 OBSTRUCTIVE SLEEP APNEA (ADULT) (PEDIATRIC): ICD-10-CM

## 2022-01-03 DIAGNOSIS — G47.33 OBSTRUCTIVE SLEEP APNEA (ADULT) (PEDIATRIC): ICD-10-CM

## 2022-01-03 DIAGNOSIS — Z01.811 ENCOUNTER FOR PREPROCEDURAL RESPIRATORY EXAMINATION: ICD-10-CM

## 2022-01-03 PROCEDURE — 94727 GAS DIL/WSHOT DETER LNG VOL: CPT

## 2022-01-03 PROCEDURE — 85018 HEMOGLOBIN: CPT | Mod: QW

## 2022-01-03 PROCEDURE — 94729 DIFFUSING CAPACITY: CPT

## 2022-01-03 PROCEDURE — 94010 BREATHING CAPACITY TEST: CPT

## 2022-01-03 PROCEDURE — 99214 OFFICE O/P EST MOD 30 MIN: CPT | Mod: 25

## 2022-01-03 NOTE — ASSESSMENT
[FreeTextEntry1] : Patient has severe obstructive sleep apnea on CPAP 11 with excellent compliance and benefit from CPAP. CPAP supplies were renewed.\par \par The patient has normal lung function. She is cleared for bariatric surgery from a pulmonary and sleep medicine point of view. She should bring her CPAP machine to the hospital for postoperative use.\par \par The patient's CPAP machine was adjusted to automatic settings of 4-14 cm water[address] This way, as she loses weight, machine will deliver less pressure. If all goes well, she may not have sleep apnea in the future. I will see her back in a year to see how she's doing.

## 2022-01-03 NOTE — PROCEDURE
[FreeTextEntry1] : Pulmonary function studies are normal other than obesity related volume changes. Hemoglobin is 14.4.

## 2022-01-03 NOTE — HISTORY OF PRESENT ILLNESS
[TextBox_4] : Patient has had recurrent abdominal wall hernias. She is now going for revision of her gastric sleeve to bypass. Remains on CPAP with excellent compliance and benefit. Surgery may be taking place in the next 2 months. [Obstructive Sleep Apnea] : obstructive sleep apnea [TextBox_100] : 9/18 [TextBox_108] : 60 [TextBox_112] : 98 [TextBox_116] : 87

## 2022-01-07 ENCOUNTER — APPOINTMENT (OUTPATIENT)
Dept: CARDIOLOGY | Facility: CLINIC | Age: 62
End: 2022-01-07

## 2022-01-12 ENCOUNTER — APPOINTMENT (OUTPATIENT)
Dept: SURGERY | Facility: CLINIC | Age: 62
End: 2022-01-12
Payer: COMMERCIAL

## 2022-01-12 VITALS
WEIGHT: 236.05 LBS | TEMPERATURE: 97.2 F | DIASTOLIC BLOOD PRESSURE: 83 MMHG | BODY MASS INDEX: 44.57 KG/M2 | OXYGEN SATURATION: 98 % | SYSTOLIC BLOOD PRESSURE: 120 MMHG | HEIGHT: 61 IN | RESPIRATION RATE: 16 BRPM | HEART RATE: 72 BPM

## 2022-01-12 PROCEDURE — 99213 OFFICE O/P EST LOW 20 MIN: CPT

## 2022-01-13 NOTE — HISTORY OF PRESENT ILLNESS
[de-identified] : Ms. ORTIZ CASTILLO is a 61 year old with history of morbid obesity, preop for bariatric surgery with Dr. Glass (date TBD). Here today for NUTRITION VISIT #4. Feels well, denies pain, fever, chills, nausea or vomiting.\par

## 2022-01-13 NOTE — ASSESSMENT
[FreeTextEntry1] : Pt is seeking surgical intervention so the physical restriction of the surgery will allow for early satiety providing a catalyst for lifestyle change to assist with wt loss. Pt seen for 4th visit of the 6 month medically supervised wt loss program. Pt is a 62 y/o F, whose current wt is 236#, which is above IBW range and BMI is indicative of morbidly obese wt status (BMI: 236#). Pt presents with wt loss of -1# x 30 days.\par \par Breakfast: 1 scrambled eggs, tea with splenda\par Lunch: 1/2 sandwich or Salad (cucumbers, radishes, carrots, string beans, olive oil & balsamic vinegar) with Grilled Chicken or Chickpeas \par Dinner: homemade chicken soup or pork chop, small baked potatoes, and salad \par Fluids: 8oz Crystal light or 3 16.9oz water bottles\par \par Pt reports old habits as large portion sizes and eating past the point of satisfied as obstacles to weight control. Patient admits to eating quickly, skipping meals, and poor food choices. Re-enforced the importance of a balanced, healthy diet of nourishing foods and consistent meal pattern for long-term wt loss success and health maintenance.  Pt was provided with nutrition guidelines pre- and post-operatively and eating behavior tips. Detailed information was provided on portion control, diet progression, nutrient and fluid needs, satiety cues, meal duration, and timing of food/fluid intake. Pt provided with resources to facilitate self-care habits, such as food logging. Pt verbalized understanding and expressed gratitude.\par \par Exercise: None - injured wrist falling. Discussed indoor exercises through youtube videos or seated exercise handout. Pt verbalized understanding.\par \par Pt was informed of the importance of surgical preparation and behavior modification. It was explained that weight gain during the pre-operative period may result in insurance and/or surgeon denial. Discussed strategies to assist with behavior modification and wt loss. The pt is confident that she can make the changes necessary to support wt loss success and is motivated. \par \par PRE-OPERATIVE NUTRITION GOALS: \par -Work to improve quality of food choice to maximize nourishment post-operatively \par -Practice eating techniques necessary for tolerance post-operatively \par -Be mindful of portion sizes and start food logging\par -Increase physical activity as tolerated\par \par Will follow up with pt in 1 month. RD to remain available for ongoing support and encouragement.

## 2022-01-25 ENCOUNTER — APPOINTMENT (OUTPATIENT)
Dept: CARDIOLOGY | Facility: CLINIC | Age: 62
End: 2022-01-25
Payer: COMMERCIAL

## 2022-01-25 ENCOUNTER — NON-APPOINTMENT (OUTPATIENT)
Age: 62
End: 2022-01-25

## 2022-01-25 VITALS
HEART RATE: 75 BPM | OXYGEN SATURATION: 97 % | DIASTOLIC BLOOD PRESSURE: 70 MMHG | SYSTOLIC BLOOD PRESSURE: 122 MMHG | BODY MASS INDEX: 45.31 KG/M2 | RESPIRATION RATE: 16 BRPM | TEMPERATURE: 98.5 F | HEIGHT: 61 IN | WEIGHT: 240 LBS

## 2022-01-25 DIAGNOSIS — S62.102A FRACTURE OF UNSPECIFIED CARPAL BONE, LEFT WRIST, INITIAL ENCOUNTER FOR CLOSED FRACTURE: ICD-10-CM

## 2022-01-25 DIAGNOSIS — Z01.810 ENCOUNTER FOR PREPROCEDURAL CARDIOVASCULAR EXAMINATION: ICD-10-CM

## 2022-01-25 PROCEDURE — 99213 OFFICE O/P EST LOW 20 MIN: CPT

## 2022-01-25 PROCEDURE — 99203 OFFICE O/P NEW LOW 30 MIN: CPT

## 2022-01-25 PROCEDURE — 93000 ELECTROCARDIOGRAM COMPLETE: CPT | Mod: NC

## 2022-01-25 RX ORDER — ELECTROLYTES/DEXTROSE
SOLUTION, ORAL ORAL
Refills: 0 | Status: ACTIVE | COMMUNITY
Start: 2022-01-25

## 2022-01-25 NOTE — HISTORY OF PRESENT ILLNESS
[FreeTextEntry1] : 61F h/o hypothyroid, morbid obesity (BMI remains at 45 despite prior gastric sleeve in 2016 at [place]), TARA/CPAP, planning revision of gastric sleeve bariatric surgery and hernia repair, refer for preoperative cardiac evaluation. \par \par Baseline does not exercise, only walking within the house, able to climbs 12 steps of stairs denies chest pain or shortness of breath however does not exert herself. Had a mechanical fall on ice few weeks ago wearing L-arm splint for fracture. \par \par Had prior brain tumor resection for acoustic neuroma 30yrs. Most recent surgery was for hernias. \par \par \par No CAD or stroke in family\par Former smoker for 20yrs quit in 2016\par Social alcohol\par Completed COVID vaccine booster

## 2022-01-25 NOTE — PHYSICAL EXAM
[Well Developed] : well developed [Well Nourished] : well nourished [No Acute Distress] : no acute distress [Normal Conjunctiva] : normal conjunctiva [Normal Venous Pressure] : normal venous pressure [No Carotid Bruit] : no carotid bruit [Normal S1, S2] : normal S1, S2 [No Murmur] : no murmur [No Rub] : no rub [No Gallop] : no gallop [Clear Lung Fields] : clear lung fields [Good Air Entry] : good air entry [No Respiratory Distress] : no respiratory distress  [Soft] : abdomen soft [Non Tender] : non-tender [No Masses/organomegaly] : no masses/organomegaly [Normal Bowel Sounds] : normal bowel sounds [Normal Gait] : normal gait [No Edema] : no edema [No Cyanosis] : no cyanosis [No Clubbing] : no clubbing [No Varicosities] : no varicosities [No Rash] : no rash [No Skin Lesions] : no skin lesions [Moves all extremities] : moves all extremities [No Focal Deficits] : no focal deficits [Normal Speech] : normal speech [Alert and Oriented] : alert and oriented [Normal memory] : normal memory [de-identified] : morbid obese [de-identified] : L-forearm splint

## 2022-01-25 NOTE — CARDIOLOGY SUMMARY
[de-identified] : 1/25/22- Sinus 78, normal axis, nonspecific T-wave, poor R-wave progression, QTc 373

## 2022-01-25 NOTE — DISCUSSION/SUMMARY
[FreeTextEntry1] : 61F h/o hypothyroid, morbid obesity (BMI remains at 45 despite prior gastric sleeve in 2016 at [facility]), TARA/CPAP, planning revision of gastric sleeve bariatric surgery and hernia repair, refer for preoperative cardiac evaluation. \par \par Baseline limited MET due to lack of exercise, recent L-wrist fracture with forearm splint not able to perform exercise treadmill stress test, will obtain pharm nuclear stress and check baseline echocardiogram. \par \par \par \par 1. Preoperative cardiac risk eval.\par -await result of TTE and pharm nuclear stress test. \par -if results are normal then no cardiac contraindication for the planned surgery. \par \par 2. Morbid obesity\par -need dieting and weight loss\par -recent , A1c result not available\par \par \par \par \par Follow up as needed if the above tests are normal.

## 2022-02-09 ENCOUNTER — APPOINTMENT (OUTPATIENT)
Dept: SURGERY | Facility: CLINIC | Age: 62
End: 2022-02-09
Payer: COMMERCIAL

## 2022-02-09 VITALS
RESPIRATION RATE: 16 BRPM | OXYGEN SATURATION: 97 % | HEIGHT: 61 IN | DIASTOLIC BLOOD PRESSURE: 72 MMHG | TEMPERATURE: 97.5 F | BODY MASS INDEX: 44.57 KG/M2 | HEART RATE: 74 BPM | SYSTOLIC BLOOD PRESSURE: 101 MMHG | WEIGHT: 236.04 LBS

## 2022-02-09 PROCEDURE — 99213 OFFICE O/P EST LOW 20 MIN: CPT

## 2022-02-11 NOTE — ASSESSMENT
[FreeTextEntry1] : Pt is seeking surgical intervention so the physical restriction of the surgery will allow for early satiety providing a catalyst for lifestyle change to assist with wt loss. Pt seen for 5th visit of the 6 month medically supervised wt loss program. Pt is a 61 year y/o F, whose current wt is 236#, which is above IBW range and BMI is indicative of morbidly obese wt status (BMI: 44.7). Pt presents to the office with a fractured wrist due a fall. Pt states she  has been cooking and prepping food for her lately. Pt's wt has been stable x 30 days. \par \par Breakfast: 1 piece of toast, coffee with splenda and 2% milk\par Snack: fruit\par Lunch: Salad (cucumber, radishes, carrots, string beans), olive oil & balsamic vinegar with grilled chicken\par Dinner: Protein, starch, and veg\par Snack: 2 cookies\par Fluids: water, crystal light\par Alcohol: None\par \par Re-enforced the importance of a balanced, healthy diet of nourishing foods and consistent meal pattern for long-term wt loss success and health maintenance. Pt was provided with nutrition guidelines pre- and post-operatively and eating behavior tips during last visit. Re-enforced information provided on portion control, diet progression, nutrient and fluid needs, satiety cues, meal duration, and timing of food/fluid intake. Pt stated she is aware of how her eating habits will change and is prepared. Pt verbalized understanding and expressed gratitude.\par \par Exercise: None. Re-enforced the importance of physical therapy after surgery to aid in healing. Discussed the goal of walking for at least 30 minutes per day. Pt verbalized understanding.  \par \par Pt was informed of the importance of surgical preparation and behavior modification. It was explained that weight gain during the pre-operative period may result in insurance and/or surgeon denial. Discussed strategies to assist with behavior modification and wt loss. The pt is confident that She can make the changes necessary to support wt loss success and is motivated. \par \par PRE-OPERATIVE NUTRITION GOALS: \par -Consume consistent protein containing meals \par -Work to improve quality of food choice to maximize nourishment post-operatively \par -Limit snacks outside of physical hunger \par -Practice eating techniques necessary for tolerance post-operatively \par -Increase physical activity as tolerated\par -Be mindful of portion sizes and start food logging\par \par Will follow up with pt in 1 month. RD to remain available for ongoing support and encouragement.

## 2022-02-11 NOTE — HISTORY OF PRESENT ILLNESS
[de-identified] : Ms. ORTIZ CASTILLO is a 61 year old with history of morbid obesity, preop for bariatric surgery with  *** (date TBD). Here today for NUTRITION ***. Feels well, denies pain, fever, chills, nausea or vomiting.\par

## 2022-02-14 ENCOUNTER — APPOINTMENT (OUTPATIENT)
Dept: CARDIOLOGY | Facility: CLINIC | Age: 62
End: 2022-02-14
Payer: COMMERCIAL

## 2022-02-14 PROCEDURE — 78452 HT MUSCLE IMAGE SPECT MULT: CPT

## 2022-02-14 PROCEDURE — 93306 TTE W/DOPPLER COMPLETE: CPT

## 2022-02-14 PROCEDURE — 93015 CV STRESS TEST SUPVJ I&R: CPT

## 2022-02-14 PROCEDURE — A9500: CPT

## 2022-02-25 ENCOUNTER — APPOINTMENT (OUTPATIENT)
Dept: GASTROENTEROLOGY | Facility: CLINIC | Age: 62
End: 2022-02-25
Payer: COMMERCIAL

## 2022-02-25 VITALS
BODY MASS INDEX: 44.37 KG/M2 | TEMPERATURE: 97.2 F | HEART RATE: 83 BPM | DIASTOLIC BLOOD PRESSURE: 95 MMHG | RESPIRATION RATE: 14 BRPM | OXYGEN SATURATION: 98 % | SYSTOLIC BLOOD PRESSURE: 160 MMHG | HEIGHT: 61 IN | WEIGHT: 235 LBS

## 2022-02-25 PROCEDURE — 99203 OFFICE O/P NEW LOW 30 MIN: CPT

## 2022-02-25 NOTE — HISTORY OF PRESENT ILLNESS
[de-identified] : 61-year-old female with past medical history of morbid obesity status post sleeve gastrectomy, TARA on CPAP presents for evaluation for endoscopy prior to bariatric gastric sleeve revision.  Patient reports that she feels well overall, denies any nausea/vomiting/fever/chills/pain abdomen/blood in stool/black stool/constipation/diarrhea/dysphagia/odynophagia/reflux.\par \par No recent change in weight or appetite\par No family history of GI malignancy\par Non-smoker, no alcohol use\par \par No prior endoscopy\par Colonoscopy in 2015, rectal hyperplastic polyp was removed next to 2025\par \par Reports prior history of sleeve gastrectomy, hysterectomy, hernia repair x2

## 2022-02-25 NOTE — PHYSICAL EXAM
[General Appearance - Alert] : alert [General Appearance - In No Acute Distress] : in no acute distress [Sclera] : the sclera and conjunctiva were normal [Outer Ear] : the ears and nose were normal in appearance [Neck Appearance] : the appearance of the neck was normal [Heart Rate And Rhythm] : heart rate was normal and rhythm regular [Abdomen Soft] : soft [Abdomen Tenderness] : non-tender [Cervical Lymph Nodes Enlarged Anterior Bilaterally] : anterior cervical [No CVA Tenderness] : no ~M costovertebral angle tenderness [Abnormal Walk] : normal gait [Skin Color & Pigmentation] : normal skin color and pigmentation [] : no rash [Oriented To Time, Place, And Person] : oriented to person, place, and time

## 2022-02-25 NOTE — ASSESSMENT
[FreeTextEntry1] : 61-year-old female with past medical history of morbid obesity status post sleeve gastrectomy, TARA on CPAP presents for evaluation for endoscopy prior to bariatric gastric sleeve revision. \par \par Morbid obesity: We will plan for gastric sleeve revision with bariatric surgery\par -CBC/CMP reviewed from October 2021, WNL\par -Patient recently followed up with cardiology, underwent stress test and echo that were normal\par -Patient recently followed up with pulmonology, was cleared for surgery\par -We will plan for EGD plus minus biopsies at the hospital considering significant comorbidities\par \par Will plan for EGD +/- biopsies. Discussed indications, risks and benefits of procedure with patient. Risks include, but not limited to, bleeding, perforation, infection, and reaction to anesthesia. Patient was given the opportunity to ask questions, all questions were answered. The patient agrees to proceed with EGD COVID swab prior to procedure

## 2022-03-01 ENCOUNTER — APPOINTMENT (OUTPATIENT)
Dept: SURGERY | Facility: CLINIC | Age: 62
End: 2022-03-01
Payer: COMMERCIAL

## 2022-03-01 VITALS
RESPIRATION RATE: 14 BRPM | DIASTOLIC BLOOD PRESSURE: 84 MMHG | TEMPERATURE: 97.2 F | SYSTOLIC BLOOD PRESSURE: 130 MMHG | OXYGEN SATURATION: 96 % | HEIGHT: 61 IN | HEART RATE: 72 BPM | BODY MASS INDEX: 44.77 KG/M2 | WEIGHT: 237.13 LBS

## 2022-03-01 DIAGNOSIS — Z01.818 ENCOUNTER FOR OTHER PREPROCEDURAL EXAMINATION: ICD-10-CM

## 2022-03-01 DIAGNOSIS — Z71.3 DIETARY COUNSELING AND SURVEILLANCE: ICD-10-CM

## 2022-03-01 PROCEDURE — 99214 OFFICE O/P EST MOD 30 MIN: CPT

## 2022-03-01 NOTE — HISTORY OF PRESENT ILLNESS
[de-identified] : Ms. ORTIZ CASTILLO is a 61 year old with history of morbid obesity, preop for bariatric surgery with Dr. Glass (date TBD). Here today for NUTRITION VISIT #6 and NUTRITION EVALUATION Feels well, denies pain, fever, chills, nausea or vomiting.\par

## 2022-03-01 NOTE — ASSESSMENT
[FreeTextEntry1] : PRE-SURGICAL NUTRITIONAL EVALUATION & 6th NUTRITION VISIT\par \par Thank you for referring your pt ORTIZ CASTILLO for the pre-surgical nutritional consultation and evaluation. Pt is seeking surgical intervention so the physical restriction of the surgery will allow for early satiety providing a catalyst for lifestyle change to assist with wt loss. Pt is a 61 year old F, whose current wt is 237#, which is above IBW range and BMI is indicative of morbidly obese wt status (BMI: 44.8). Pt presents with wt gain of 2# x 30 days.\par \par MEDICAL HISTORY: as above \par \par MEDICATION: as above \par \par DIET & WEIGHT HISTORY: The pt has been in the pursuit of reaching a healthy weight almost all their life. Pt has tried weight watchers, low calorie diet, portion control, and history of sleeve gastrectomy with no success. Pt reports lowest wt being 145# at 54 years old and highest wt being 243# at 61 years old. Pt understands the commitment that is required in order to be successful and adapt to the post-surgical life with the gastric bypass.\par \par FOOD ALLERGIES/INTOLERANCES: None \par \par FOOD/FLUID INTAKE: Pt lives at home with  and pt does all the cooking and food shopping. Pt understands the importance of meal prepping and planning meals in advance. Pt denies going out to restaurants or getting take out.  \par \par Breakfast: 1 piece of toast, coffee with splenda and 2% milk\par Snack: fruit\par Lunch: Salad (cucumber, radishes, carrots, string beans), olive oil & balsamic vinegar with grilled chicken\par Dinner: Protein, starch, and veg\par Fluids: water, crystal light\par Alcohol: None\par \par Re-enforced the importance of a balanced, healthy diet of nourishing foods and consistent meal pattern for long-term wt loss success and health maintenance. Pt educated on eating 4-6 small meals per day, that are high in protein for hunger regulation. Pt educated on cutting out high-sugar content foods sabotaging wt loss. Discussed how carbonated beverages and high-sugar content foods may be poorly tolerated post-operatively. Pt verbalized understanding. \par \par EXERCISE: None. Re-enforced the importance of physical activity to aid in weight loss.\par \par DIET EDUCATION: Pt was provided with nutrition guidelines pre- and post-operatively, 2-week full liquid diet, vitamin and mineral education, and eating behavior tips. Detailed information was provided on portion control, diet progression, nutrient and fluid needs, satiety cues, meal duration, timing of food/fluid intake, and mindful eating tips. Pt provided with resources to facilitate self-care habits, such as food logging and physical activity. Pt was informed of the importance of surgical preparation and behavior modification. It was explained that weight gain during the pre-operative period may result in insurance and/or surgeon denial. Discussed strategies to assist with behavior modification and wt loss. The pt is confident that She can make the changes necessary to support wt loss success and is motivated to do so. \par \par CONCLUSION: Nutritional evaluation reveals obesity with co-morbidities in a F, who has been unable to lose weight despite multiple attempts. Bariatric surgical intervention can help pt experience significant wt loss and improvement of co-morbidities. Based on a thorough assessment, there are no nutrition related contraindications to bariatric surgery. Pt is aware that their commitment to the lifestyle changes will directly influence their health and weight loss success. Patient displayed good comprehension of all material presented and discussed. \par \par PRE-OPERATIVE NUTRITION GOALS:  \par -Consume consistent protein containing meals  \par -Work to improve quality of food choice to maximize nourishment post-operatively  \par -Limit snacks outside of physical hunger  \par -Practice eating techniques necessary for tolerance post-operatively\par -Increase physical activity as tolerated\par -Be mindful of portion sizes and start food logging\par \par Pt was instructed to contact team members and/or RD with any questions or concerns. Pt to follow up post-operatively for nutrition support and encouragement.

## 2022-03-05 ENCOUNTER — LABORATORY RESULT (OUTPATIENT)
Age: 62
End: 2022-03-05

## 2022-03-07 ENCOUNTER — TRANSCRIPTION ENCOUNTER (OUTPATIENT)
Age: 62
End: 2022-03-07

## 2022-03-08 ENCOUNTER — OUTPATIENT (OUTPATIENT)
Dept: OUTPATIENT SERVICES | Facility: HOSPITAL | Age: 62
LOS: 1 days | End: 2022-03-08
Payer: COMMERCIAL

## 2022-03-08 ENCOUNTER — RESULT REVIEW (OUTPATIENT)
Age: 62
End: 2022-03-08

## 2022-03-08 ENCOUNTER — APPOINTMENT (OUTPATIENT)
Dept: GASTROENTEROLOGY | Facility: HOSPITAL | Age: 62
End: 2022-03-08

## 2022-03-08 DIAGNOSIS — Z90.710 ACQUIRED ABSENCE OF BOTH CERVIX AND UTERUS: Chronic | ICD-10-CM

## 2022-03-08 DIAGNOSIS — E66.01 MORBID (SEVERE) OBESITY DUE TO EXCESS CALORIES: ICD-10-CM

## 2022-03-08 DIAGNOSIS — Z98.89 OTHER SPECIFIED POSTPROCEDURAL STATES: Chronic | ICD-10-CM

## 2022-03-08 PROCEDURE — 88342 IMHCHEM/IMCYTCHM 1ST ANTB: CPT

## 2022-03-08 PROCEDURE — 43239 EGD BIOPSY SINGLE/MULTIPLE: CPT

## 2022-03-08 PROCEDURE — 88342 IMHCHEM/IMCYTCHM 1ST ANTB: CPT | Mod: 26

## 2022-03-08 PROCEDURE — 88312 SPECIAL STAINS GROUP 1: CPT

## 2022-03-08 PROCEDURE — 88305 TISSUE EXAM BY PATHOLOGIST: CPT

## 2022-03-08 PROCEDURE — 88305 TISSUE EXAM BY PATHOLOGIST: CPT | Mod: 26

## 2022-03-08 PROCEDURE — 88312 SPECIAL STAINS GROUP 1: CPT | Mod: 26

## 2022-03-15 LAB — SURGICAL PATHOLOGY STUDY: SIGNIFICANT CHANGE UP

## 2022-03-16 DIAGNOSIS — K29.70 GASTRITIS, UNSPECIFIED, W/OUT BLEEDING: ICD-10-CM

## 2022-03-16 RX ORDER — OMEPRAZOLE 40 MG/1
40 CAPSULE, DELAYED RELEASE ORAL
Qty: 60 | Refills: 0 | Status: ACTIVE | COMMUNITY
Start: 2022-03-16 | End: 1900-01-01

## 2022-04-04 ENCOUNTER — APPOINTMENT (OUTPATIENT)
Dept: SURGERY | Facility: CLINIC | Age: 62
End: 2022-04-04
Payer: COMMERCIAL

## 2022-04-04 VITALS
OXYGEN SATURATION: 97 % | DIASTOLIC BLOOD PRESSURE: 73 MMHG | TEMPERATURE: 97.3 F | RESPIRATION RATE: 14 BRPM | BODY MASS INDEX: 44.46 KG/M2 | WEIGHT: 235.5 LBS | HEART RATE: 67 BPM | HEIGHT: 61 IN | SYSTOLIC BLOOD PRESSURE: 118 MMHG

## 2022-04-04 PROCEDURE — 99213 OFFICE O/P EST LOW 20 MIN: CPT

## 2022-04-13 ENCOUNTER — OUTPATIENT (OUTPATIENT)
Dept: OUTPATIENT SERVICES | Facility: HOSPITAL | Age: 62
LOS: 1 days | End: 2022-04-13
Payer: COMMERCIAL

## 2022-04-13 VITALS
TEMPERATURE: 97 F | WEIGHT: 236.12 LBS | RESPIRATION RATE: 20 BRPM | DIASTOLIC BLOOD PRESSURE: 60 MMHG | SYSTOLIC BLOOD PRESSURE: 118 MMHG | OXYGEN SATURATION: 98 % | HEIGHT: 62 IN | HEART RATE: 72 BPM

## 2022-04-13 DIAGNOSIS — Z01.818 ENCOUNTER FOR OTHER PREPROCEDURAL EXAMINATION: ICD-10-CM

## 2022-04-13 DIAGNOSIS — Z98.89 OTHER SPECIFIED POSTPROCEDURAL STATES: Chronic | ICD-10-CM

## 2022-04-13 DIAGNOSIS — Z29.9 ENCOUNTER FOR PROPHYLACTIC MEASURES, UNSPECIFIED: ICD-10-CM

## 2022-04-13 DIAGNOSIS — Z90.710 ACQUIRED ABSENCE OF BOTH CERVIX AND UTERUS: Chronic | ICD-10-CM

## 2022-04-13 DIAGNOSIS — Z98.890 OTHER SPECIFIED POSTPROCEDURAL STATES: Chronic | ICD-10-CM

## 2022-04-13 DIAGNOSIS — E66.01 MORBID (SEVERE) OBESITY DUE TO EXCESS CALORIES: ICD-10-CM

## 2022-04-13 LAB
A1C WITH ESTIMATED AVERAGE GLUCOSE RESULT: 5.9 % — HIGH (ref 4–5.6)
ALBUMIN SERPL ELPH-MCNC: 3.8 G/DL — SIGNIFICANT CHANGE UP (ref 3.3–5.2)
ALP SERPL-CCNC: 104 U/L — SIGNIFICANT CHANGE UP (ref 40–120)
ALT FLD-CCNC: 36 U/L — HIGH
AMYLASE P1 CFR SERPL: 36 U/L — SIGNIFICANT CHANGE UP (ref 36–128)
ANION GAP SERPL CALC-SCNC: 11 MMOL/L — SIGNIFICANT CHANGE UP (ref 5–17)
APTT BLD: 30.8 SEC — SIGNIFICANT CHANGE UP (ref 27.5–35.5)
AST SERPL-CCNC: 33 U/L — HIGH
BASOPHILS # BLD AUTO: 0.08 K/UL — SIGNIFICANT CHANGE UP (ref 0–0.2)
BASOPHILS NFR BLD AUTO: 1.1 % — SIGNIFICANT CHANGE UP (ref 0–2)
BILIRUB SERPL-MCNC: 0.4 MG/DL — SIGNIFICANT CHANGE UP (ref 0.4–2)
BLD GP AB SCN SERPL QL: SIGNIFICANT CHANGE UP
BUN SERPL-MCNC: 14.5 MG/DL — SIGNIFICANT CHANGE UP (ref 8–20)
CALCIUM SERPL-MCNC: 8.6 MG/DL — SIGNIFICANT CHANGE UP (ref 8.6–10.2)
CHLORIDE SERPL-SCNC: 104 MMOL/L — SIGNIFICANT CHANGE UP (ref 98–107)
CO2 SERPL-SCNC: 25 MMOL/L — SIGNIFICANT CHANGE UP (ref 22–29)
CREAT SERPL-MCNC: 0.64 MG/DL — SIGNIFICANT CHANGE UP (ref 0.5–1.3)
EGFR: 100 ML/MIN/1.73M2 — SIGNIFICANT CHANGE UP
EOSINOPHIL # BLD AUTO: 0.15 K/UL — SIGNIFICANT CHANGE UP (ref 0–0.5)
EOSINOPHIL NFR BLD AUTO: 2.1 % — SIGNIFICANT CHANGE UP (ref 0–6)
ESTIMATED AVERAGE GLUCOSE: 123 MG/DL — HIGH (ref 68–114)
GLUCOSE SERPL-MCNC: 100 MG/DL — HIGH (ref 70–99)
HCT VFR BLD CALC: 41.6 % — SIGNIFICANT CHANGE UP (ref 34.5–45)
HGB BLD-MCNC: 13.6 G/DL — SIGNIFICANT CHANGE UP (ref 11.5–15.5)
IMM GRANULOCYTES NFR BLD AUTO: 0.3 % — SIGNIFICANT CHANGE UP (ref 0–1.5)
INR BLD: 1.08 RATIO — SIGNIFICANT CHANGE UP (ref 0.88–1.16)
LIDOCAIN IGE QN: 35 U/L — SIGNIFICANT CHANGE UP (ref 22–51)
LYMPHOCYTES # BLD AUTO: 2.02 K/UL — SIGNIFICANT CHANGE UP (ref 1–3.3)
LYMPHOCYTES # BLD AUTO: 28.5 % — SIGNIFICANT CHANGE UP (ref 13–44)
MCHC RBC-ENTMCNC: 30.8 PG — SIGNIFICANT CHANGE UP (ref 27–34)
MCHC RBC-ENTMCNC: 32.7 GM/DL — SIGNIFICANT CHANGE UP (ref 32–36)
MCV RBC AUTO: 94.1 FL — SIGNIFICANT CHANGE UP (ref 80–100)
MONOCYTES # BLD AUTO: 0.73 K/UL — SIGNIFICANT CHANGE UP (ref 0–0.9)
MONOCYTES NFR BLD AUTO: 10.3 % — SIGNIFICANT CHANGE UP (ref 2–14)
NEUTROPHILS # BLD AUTO: 4.08 K/UL — SIGNIFICANT CHANGE UP (ref 1.8–7.4)
NEUTROPHILS NFR BLD AUTO: 57.7 % — SIGNIFICANT CHANGE UP (ref 43–77)
PLATELET # BLD AUTO: 256 K/UL — SIGNIFICANT CHANGE UP (ref 150–400)
POTASSIUM SERPL-MCNC: 4.2 MMOL/L — SIGNIFICANT CHANGE UP (ref 3.5–5.3)
POTASSIUM SERPL-SCNC: 4.2 MMOL/L — SIGNIFICANT CHANGE UP (ref 3.5–5.3)
PROT SERPL-MCNC: 7.1 G/DL — SIGNIFICANT CHANGE UP (ref 6.6–8.7)
PROTHROM AB SERPL-ACNC: 12.5 SEC — SIGNIFICANT CHANGE UP (ref 10.5–13.4)
RBC # BLD: 4.42 M/UL — SIGNIFICANT CHANGE UP (ref 3.8–5.2)
RBC # FLD: 12.9 % — SIGNIFICANT CHANGE UP (ref 10.3–14.5)
SODIUM SERPL-SCNC: 140 MMOL/L — SIGNIFICANT CHANGE UP (ref 135–145)
WBC # BLD: 7.08 K/UL — SIGNIFICANT CHANGE UP (ref 3.8–10.5)
WBC # FLD AUTO: 7.08 K/UL — SIGNIFICANT CHANGE UP (ref 3.8–10.5)

## 2022-04-13 PROCEDURE — 93010 ELECTROCARDIOGRAM REPORT: CPT

## 2022-04-13 PROCEDURE — 93005 ELECTROCARDIOGRAM TRACING: CPT

## 2022-04-13 PROCEDURE — G0463: CPT

## 2022-04-13 NOTE — H&P PST ADULT - HISTORY OF PRESENT ILLNESS
62 yo F PMH of hypothyroid, GERD, obesity (BMI 43.2), presents for preop assessment prior to robotic gastric bypass, cholecystectomy, possible hiatal hernia repair w/Dr Glass scheduled for 5/3/2022 62 yo F PMH of hypothyroid, TARA on CPAP, GERD, obesity (BMI 43.2), s/p gastric sleeve, patient reports she tried weight watchers, exercise over the years without meaningful success, presents for preop assessment prior to robotic gastric bypass, cholecystectomy, possible hiatal hernia repair w/Dr Glass scheduled for 5/3/2022

## 2022-04-13 NOTE — H&P PST ADULT - RS GEN PE MLT RESP DETAILS PC
respirations non-labored/no rales/no rhonchi/no wheezes/diminished breath sounds, L/diminished breath sounds, R

## 2022-04-13 NOTE — PATIENT PROFILE ADULT - FALL HARM RISK - RISK INTERVENTIONS

## 2022-04-13 NOTE — H&P PST ADULT - NSICDXFAMILYHX_GEN_ALL_CORE_FT
FAMILY HISTORY:  Family history of diabetes mellitus  Family history of heart disease     FAMILY HISTORY:  Family history of diabetes mellitus, father, mother

## 2022-04-13 NOTE — H&P PST ADULT - NEGATIVE GASTROINTESTINAL SYMPTOMS
"     [facility]             Inflammatory Bowel Disease Follow-up  Note              TODAY'S VISIT DATE: 05/15/2019    Chief Complaint:   Chief Complaint   Patient presents with    Crohn's Disease     PCP: Primary Doctor No    Previous History:  Joss Matute is a 31 y.o. male with Crohn's disease: ileum, rectal ulcer and perianal fistula/abscess; no active ileitis now (on last colonoscopy 1998 but normal ileum 2007, 1/2017, 4/2017); 4/2017 colonoscopy with rectal ulcer.  He began with symptoms of a perirectal abscess in December 1996 that was initially drained in the ED.  The abscess recurred in December 1997 and was associated with vomiting, weight loss, and abdominal pain.  His initial colonoscopy in 1998 (no actual record just reported in clinic note) showed moderately severe ileitis with biopsies of the right/transverse/left colon and rectum with mild nonspecific acute and chronic reactive changes.  The transverse colon polyp (likely removed) pathology showed it to be "edematous with mild glandular dysplasia."  At that time he was finally diagnosed with Crohn's disease but unsure of the exact location.  Initially he recalls being given prednisone for 4-5 months and pentasa for maintenance.  By 2002, age 15, he stopped all medications and did not have any GI follow up.  He had a hospital admission for a "flare" with dehydration in 2004 and again was not started on any medications.  He was seen in 2007 for RLQ abdominal cramping, increasing fatigue, and increased bowel frequency with blood and mucous.  He had a repeat colonoscopy in 2/2007 that showed patchy area of mucosa in the distal 4 cm of the TI with normal biopsies.  He had recurrent abscess/fistula in 2008 that spontaneously drained and was seen by CRS and did not wish to take any medications.  He reports intermittent abscess/perianal fistula from [id number] with chronic drainage that would cause he to intermittently seek treatment of " I&D in the ER.  In 10/2016 he established care with Dr. Bryson when he had a recurrent perianal fistula who planned to start patient on humira after surgical intervention.  He was seen by Dr. Cason on 3/15/2017 and had an EUA with seton placement.  He most recent colonoscopy on 4/7/2017 performed by Dr. LEANDRO Spence showed showed perianal skin tag, seton intact and small rectal ulcer with biopsies consistent with patchy active colitis with features of chronic mucosal injury.  He started Humira on 5/1/2017.  Setons were removed by Dr. Cason in 7/2017.  He started oral MTX in 8/2017 for immunogenicity prevention.  Trough [facility] humira levels were 2.5 with ABs 57 (low) on 1/8/2018 so we planned to increase humira to 40 mg SC weekly and start oral MTX 12.5 mg PO weekly with daily folic acid.  He started humira 40 mg SC every 7 days on 2/6/2018.  Repeat trough [facility] Humira levels were 15 with low ABs of 30 on 5/17/2018.  He had elevated LFTs on 8/13/2018 so oral MTX was discontinued.  Colonoscopy on 10/10/2018 showed a small perianal skin tar, rectal fistula, other normal rectum, colon, and ileum with normal biopsies.  Trough [facility] Humira level 20 with low ABs 26 on 11/16/2018 on humira 40 mg SC every 7 days.  Patient was seen by Dr. Monterroso to assess his fistula and discuss repair on 3/13/2019.  MRI on 3/20/2019 ordered by Dr. Monterroso showed three perianal fistulas with 2 appeared to have blind endings and one extending into the skin surface at the right medial gluteal fold.  Dr. Monterroso instructed patient to continue medical treatment and call if any recurrent abscess formation.      Interval History:  - current IBD meds: Humira 40 mg SC every 7 days (started 5/1/17, increased to humira 40 mg SC every 7 days 2/6/2018); last dose 5/11/2019, next dose 5/18/2019  - 2-3 soft-formed Bowel Movements/day with no blood or nocturnal BMs (patient's baseline)  - 3/20/2019 MRI pelvis: Three perianal fistulous, two appear blind  "ending and one of which extends to the skin surface at the right medial gluteal fold.  No evidence for abscess  - taking Imodium 2-3 tablets 3 times/week; uses to decrease frequency while working  - felt like the fistula closed last week with some swelling for 2 days then drained on its own  - 2019: had extreme diarrhea and abdominal that lasted for a few hours then since resolved; co-worker had similar symptoms 1 day prior   - minimal clear drainage from fistula, no pain, redness, or swelling  - NSAID use/indication: Advil/Aleve, not frequently  - Narcotic use: No  - Alternative/Complementary Meds for IBD: No    Prior Pertinent Surgeries:  previous fistula repair Xs 2  3/15/2017: EUA with seton placement    Pertinent Endoscopy/Imagin Colonoscopy: (per clinic note path only) showed moderately severe ileitis; right, transverse, left, and rectum showed mild non-specific acute and chronic reactive changes; " transverse polyp path showed it to be edematous with mild glandular dysplasia"  2007 Colonoscopy: patchy area of mucosa in the ileum was nodular and granular (most distal 4 cm of TI) (path: normal); moderate amount of semisolid stool in the entire colon  2012 SBFT: normal  2017 Colonoscopy: fistula in the mauricio-anal area with no active drainage; normal mucosa in the whole colon and TI (no biopsies)  2017 MRE: no definitive evidence for abnormal mucosal enhancement within the small bowel; suspicious early fistulous tract/fissure within the distal rectum/perianal region tracking along the 9 oclock position rotated into the 6 oclock position and exiting along the anal canal/fissure; no evidence for abnormal enhancing structures within the abdomen/pelvis  2017 Colonoscopy: perianal skin tag; small rectal ulcer in the rectum (seen on retroflexion) (path: ulcer and adjacent patchy active colitis and features of chronic mucosal injury), remainder colon normal (path: normal); normal TI " (path: normal); seton intact   3/20/2019 MRI pelvis: Three perianal fistulous, two appear blind ending and one of which extends to the skin surface at the right medial gluteal fold.  No evidence for abscess    Pertinent Labs:  Lab Results   Component Value Date    SEDRATE 2 04/24/2017    CRP 0.8 04/24/2017     Lab Results   Component Value Date    TTGIGA 7 04/24/2017     04/24/2017     No results found for: TSH, FREET4  Lab Results   Component Value Date    [id number] 23 (L) 08/13/2018    ESGXGLON70 395 04/24/2017     Lab Results   Component Value Date    HEPBSAG Negative 04/24/2017    HEPBCAB Negative 04/24/2017     No results found for: UIF42WQXS  Lab Results   Component Value Date    TSPOTSCREN Negative  11/29/2018     Lab Results   Component Value Date    TPTMINTERP Normal 04/24/2017     No results found for: STOOLCULTURE, BZGHLCPWYE3O, JTGKRZQFHI8K, CDIFFICILEAN, CDIFFTOX, CDIFFICILEBY  No results found for: CALPROTECTIN    Therapeutic Drug Monitoring Labs:  1/8/2018: Trough [facility] ADA Levels 2.5, ABs 57 (low) (humira 40 mg SC every 14 days)  5/17/2018: [facility] Trough Humira Level 15, ABs 30 (low) (humira 40 mg SC every 7 days)  11/16/2018: Trough [facility] Humira Level 20, ABs 26 (low) (humira 40 mg SC every 7 days)    Prior IBD Meds:  Prednisone  Pentasa  Oral MTX 12.5 mg PO weekly with folic acid 1 mg PO daily (started 8/2017, stopped 8/13/2018 due to elevated LFTs)    Vaccinations:  Influenza (inactive): 10/2018    Pneumococcal PCV 13: 4/24/2017  Pneumococcal PCV 23: 7/25/2017  Tetanus (TdaP): 4/24/2017  HPV (males and females ages 19-25 yo): N/A     Meningococcal: No risk factors   Hepatitis B: 1/25/2018, 2/22/2018, 7/26/2018; Immune  Lab Results   Component Value Date    HEPBSAB Positive (A) 08/13/2018   Hepatitis A: 1/25/2018, 2/22/2018, 7/26/2018; Immune      Lab Results   Component Value Date    HEPAIGG Positive (A) 08/13/2018   MMR (live vaccine): UTD per patient       Chickenpox  "status/Varicella (live vaccine): Immune   Lab Results   Component Value Date    VARICELLAZOS 2.98 (H) 04/24/2017    VARICELLAINT Positive (A) 04/24/2017   Zoster (age >49 yo, live vaccine): Shingrix series recommended     Review of Systems   Constitutional: Negative for chills, fever and weight loss.   HENT:        No oral ulcers, dysphagia, oral thrush   Eyes: Negative for blurred vision, pain and redness.   Respiratory: Negative for cough and shortness of breath.    Cardiovascular: Negative for chest pain.   Gastrointestinal: Negative for abdominal pain, heartburn, nausea and vomiting.   Genitourinary: Negative for dysuria and hematuria.   Musculoskeletal: Negative for back pain and joint pain.   Skin: Negative for rash.   Psychiatric/Behavioral: Negative for depression. The patient is not nervous/anxious and does not have insomnia.      All Medical History/Surgical History/Family History/Social History/Allergies have been reviewed and updated in EMR    Review of patient's allergies indicates:  No Known Allergies    Outpatient Medications Marked as Taking for the 5/15/19 encounter (Office Visit) with CHARLY Sevilla   Medication Sig Dispense Refill    adalimumab (HUMIRA) PnKt injection Inject 0.8 mLs (40 mg total) into the skin every 7 days. 4 pen 5     Vital Signs:  Vitals:    05/15/19 1421   BP: 117/69   Pulse: 88   Resp: 16   Temp: 98.6 °F (37 °C)   SpO2: 97%   Weight: 90.4 kg (199 lb 4.7 oz)   Height: 6' 1" (1.854 m)   PainSc: 0-No pain     Physical Exam   Constitutional: He is oriented to person, place, and time. He appears well-developed.   HENT:   Mouth/Throat: No oral lesions.   Eyes: Pupils are equal, round, and reactive to light. Conjunctivae and EOM are normal.   Cardiovascular: Normal rate and regular rhythm.   Pulmonary/Chest: Effort normal and breath sounds normal.   Abdominal: Soft. There is no tenderness.   Musculoskeletal:        Right lower leg: He exhibits no swelling.        Left lower leg: " He exhibits no swelling.   Neurological: He is alert and oriented to person, place, and time.   Skin: No rash noted.   Psychiatric: He has a normal mood and affect.   Nursing note and vitals reviewed.    Labs:   Lab Results   Component Value Date    WBC 6.93 11/13/2018    HGB 14.5 11/13/2018    HCT 44.2 11/13/2018    MCV 92 11/13/2018     (H) 11/13/2018     Lab Results   Component Value Date    CREATININE 0.8 11/13/2018    ALBUMIN 4.0 11/13/2018    BILITOT 0.9 11/13/2018    ALKPHOS 50 (L) 11/13/2018    AST 19 11/13/2018    ALT 43 11/13/2018     Lab Results   Component Value Date    TSPOTSCREN Negative  11/29/2018     Assessment/Plan:  Joss Matute is a 31 y.o. male with Crohn's disease (ileum, rectal ulcer, perianal fistula/abscess) who last had perianal abscess drained with setons in 3/2017.  Though there was no active inflammation in the ileum since 1998 colonoscopy.  Patient continues to do well from a GI standpoint on humira 40 mg SC every 7 days his fistula remains open so he was seen by Dr. Monterroso to assess the fistula and repair on 3/13/2019.  MRI on 3/20/2019 ordered by Dr. Monterroso showed three perianal fistulas with 2 appeared to have blind endings and one extending into the skin surface at the right medial gluteal fold.  Dr. Monterroso instructed patient to continue medical treatment and call if any recurrent abscess formation.  He had a viral GI bug earlier this week with diarrhea and abd pain that resolved by the next day (co-worker had similar symptoms 1 day prior).  We will repeat trough humira levels and ABs annually which will be due in 11/2019 and will plan for a colonoscopy in 10/2020.  We discussed health maintenance and vaccinations as well as him needing a PCP.  If humira proves to be ineffective, he is naive to Imuran, remicade, cimzia, entyvio, and stelara.      # Crohn's disease: ileum, rectal ulcer and perianal fistula/abscess; no active ileitis now (on last colonoscopy 1998 but normal  ileum 2007, 1/2017, 4/2017); 4/2017 colonoscopy with rectal ulcer  - continue Humira 40 mg SC every 7 days, next dose due 5/18/2019  - repeat trough humira levels and ABs yearly, due 11/2019  - repeat colonoscopy due 10/2020  - CRC Risk: personal h/o of sporadic adenoma in 1998, proctitis/ileal disease with symptom onset in 1998; surveillance every 5 years   - drug monitoring labs: CBC/CMP every 6 months today; 11/29/2018 T spot negative; TPMT normal 4/2017; HBsAg negative (2/2016), HBsAg, HBcAb, HbsAb negative 4/2017    # Ex-smoker  - pt quit fall 2015  - have discussed that CD can be exacerbated with smoking and prevent meds from working and to avoid second hand smoke if possible (pt is )  - reminded to not restart    # IBD specific health maintenance:  - PCP list given previously to patient to establish care with one of our PCPs--patient still needs to set up PCP, will set up with one closer to home  Opthamologic exam recommended yearly - next due now, again reminded in clinic  Dermatologic exam recommended yearly - next due now, again reminded in clinic  Continue vitamin D3 2000 IU  Repeat Vitamin D level repeated today  Lab Results   Component Value Date    [id number] 23 (L) 08/13/2018     Follow up: with Dr. Spence in 6 months    Total visit time was 25 minutes, more than 50% of which was spent in face-to-face counseling with patient regarding evaluation and management goals and treatment options for Crohn's disease     Kim Coronado, FNP-C  Department of Gastroenterology  Inflammatory Bowel Disease Program   no nausea/no vomiting/no diarrhea/no constipation/no change in bowel habits/no abdominal pain/no melena/no hematochezia

## 2022-04-13 NOTE — H&P PST ADULT - PROBLEM SELECTOR PLAN 2
preop assessment, medical and cardiac clearance pending  robotic gastric bypass, cholecystectomy, possible hiatal hernia repair w/Dr Glass scheduled for 5/3/2022

## 2022-04-13 NOTE — H&P PST ADULT - NSICDXPASTMEDICALHX_GEN_ALL_CORE_FT
PAST MEDICAL HISTORY:  Acoustic neuroma 28 years ago    Diverticulitis     GERD (gastroesophageal reflux disease)     Hiatal hernia     Hypothyroid     Morbid (severe) obesity due to excess calories     Sleep apnea with use of continuous positive airway pressure (CPAP)

## 2022-04-13 NOTE — H&P PST ADULT - NSICDXPASTSURGICALHX_GEN_ALL_CORE_FT
PAST SURGICAL HISTORY:  H/O abdominoplasty     H/O: hysterectomy 2004    History of facial surgery 1986, facial reconstruction after excision of acoustic neuroma    S/P colectomy 10/27/2014    S/P excision of acoustic neuroma 1986     PAST SURGICAL HISTORY:  H/O abdominoplasty     H/O colonoscopy     H/O endoscopy     H/O: hysterectomy 2004 due to fibroids    History of facial surgery 1986, facial reconstruction after excision of acoustic neuroma    S/P colectomy 10/27/2014    S/P excision of acoustic neuroma 1986     PAST SURGICAL HISTORY:  H/O abdominoplasty     H/O colonoscopy     H/O endoscopy     H/O hernia repair x2    H/O: hysterectomy 2004 due to fibroids    History of facial surgery 1986, facial reconstruction after excision of acoustic neuroma    S/P colectomy 10/27/2014    S/P excision of acoustic neuroma 1986

## 2022-04-13 NOTE — H&P PST ADULT - ASSESSMENT
60 yo F PMH of hypothyroid, GERD, obesity (BMI 43.2), presents for preop assessment prior to robotic gastric bypass, cholecystectomy, possible hiatal hernia repair w/Dr Glass scheduled for 5/3/2022    Pt was educated on preop preparation with written and verbal instructions. Pt was informed to obtain clearances >3 days before surgery. Pt will review medications with PCP. Pt was educated on NSAIDs, multivitamins and herbals that increase the risk of bleeding and need to be stopped 7 days before procedure. Pt was educated on covid testing and covid prevention, i.e. social distancing, handwashing, mask wearing. Pt verbalized understanding of the above.     OPIOID RISK TOOL    FRANCISCA EACH BOX THAT APPLIES AND ADD TOTALS AT THE END    FAMILY HISTORY OF SUBSTANCE ABUSE                 FEMALE         MALE                                                Alcohol                             [  ]1 pt          [  ]3pts                                               Illegal Durgs                     [  ]2 pts        [  ]3pts                                               Rx Drugs                           [  ]4 pts        [  ]4 pts    PERSONAL HISTORY OF SUBSTANCE ABUSE                                                                                          Alcohol                             [  ]3 pts       [  ]3 pts                                               Illegal Drugs                     [  ]4 pts        [  ]4 pts                                               Rx Drugs                           [  ]5 pts        [  ]5 pts    AGE BETWEEN 16-45 YEARS                                      [  ]1 pt         [  ]1 pt    HISTORY OF PREADOLESCENT   SEXUAL ABUSE                                                             [  ]3 pts        [  ]0pts    PSYCHOLOGICAL DISEASE                     ADD, OCD, Bipolar, Schizophrenia        [  ]2 pts         [  ]2 pts                      Depression                                               [  ]1 pt           [  ]1 pt           SCORING TOTAL   (add numbers and type here)              ( 0 )                                     A score of 3 or lower indicated LOW risk for future opioid abuse  A score of 4 to 7 indicated moderate risk for future opioid abuse  A score of 8 or higher indicates a high risk for opioid abuse    CAPRINI VTE 2.0 SCORE [CLOT updated 2019]    AGE RELATED RISK FACTORS                                                       MOBILITY RELATED FACTORS  [ ] Age 41-60 years                                            (1 Point)                    [ ] Bed rest                                                        (1 Point)  [x ] Age: 61-74 years                                           (2 Points)                  [ ] Plaster cast                                                   (2 Points)  [ ] Age= 75 years                                              (3 Points)                    [ ] Bed bound for more than 72 hours                 (2 Points)    DISEASE RELATED RISK FACTORS                                               GENDER SPECIFIC FACTORS  [ ] Edema in the lower extremities                       (1 Point)              [ ] Pregnancy                                                     (1 Point)  [ ] Varicose veins                                               (1 Point)                     [ ] Post-partum < 6 weeks                                   (1 Point)             [x ] BMI > 25 Kg/m2                                            (1 Point)                     [ ] Hormonal therapy  or oral contraception          (1 Point)                 [ ] Sepsis (in the previous month)                        (1 Point)               [ ] History of pregnancy complications                 (1 point)  [ ] Pneumonia or serious lung disease                                               [ ] Unexplained or recurrent                     (1 Point)           (in the previous month)                               (1 Point)  [ ] Abnormal pulmonary function test                     (1 Point)                 SURGERY RELATED RISK FACTORS  [ ] Acute myocardial infarction                              (1 Point)               [ ]  Section                                             (1 Point)  [ ] Congestive heart failure (in the previous month)  (1 Point)      [ ] Minor surgery                                                  (1 Point)   [ ] Inflammatory bowel disease                             (1 Point)               [ ] Arthroscopic surgery                                        (2 Points)  [ ] Central venous access                                      (2 Points)                [ x] General surgery lasting more than 45 minutes (2 points)  [ ] Malignancy- Present or previous                   (2 Points)                [ ] Elective arthroplasty                                         (5 points)    [ ] Stroke (in the previous month)                          (5 Points)                                                                                                                                                           HEMATOLOGY RELATED FACTORS                                                 TRAUMA RELATED RISK FACTORS  [ ] Prior episodes of VTE                                     (3 Points)                [ ] Fracture of the hip, pelvis, or leg                       (5 Points)  [ ] Positive family history for VTE                         (3 Points)             [ ] Acute spinal cord injury (in the previous month)  (5 Points)  [ ] Prothrombin 22423 A                                     (3 Points)               [ ] Paralysis  (less than 1 month)                             (5 Points)  [ ] Factor V Leiden                                             (3 Points)                  [ ] Multiple Trauma within 1 month                        (5 Points)  [ ] Lupus anticoagulants                                     (3 Points)                                                           [ ] Anticardiolipin antibodies                               (3 Points)                                                       [ ] High homocysteine in the blood                      (3 Points)                                             [ ] Other congenital or acquired thrombophilia      (3 Points)                                                [ ] Heparin induced thrombocytopenia                  (3 Points)                                     Total Score [     5     ] 60 yo F PMH of hypothyroid, TARA on CPAP, GERD, obesity (BMI 43.2), presents for preop assessment prior to robotic gastric bypass, cholecystectomy, possible hiatal hernia repair w/Dr Glass scheduled for 5/3/2022    Pt was educated on preop preparation with written and verbal instructions. Pt was informed to obtain clearances >3 days before surgery. Pt was educated not to take any oral medications on the morning of surgery. Pt was educated on NSAIDs, multivitamins and herbals that increase the risk of bleeding and need to be stopped 7 days before procedure. Pt was educated on covid testing and covid prevention, i.e. social distancing, handwashing, mask wearing. Pt verbalized understanding of the above.     OPIOID RISK TOOL    FRANCISCA EACH BOX THAT APPLIES AND ADD TOTALS AT THE END    FAMILY HISTORY OF SUBSTANCE ABUSE                 FEMALE         MALE                                                Alcohol                             [  ]1 pt          [  ]3pts                                               Illegal Durgs                     [  ]2 pts        [  ]3pts                                               Rx Drugs                           [  ]4 pts        [  ]4 pts    PERSONAL HISTORY OF SUBSTANCE ABUSE                                                                                          Alcohol                             [  ]3 pts       [  ]3 pts                                               Illegal Drugs                     [  ]4 pts        [  ]4 pts                                               Rx Drugs                           [  ]5 pts        [  ]5 pts    AGE BETWEEN 16-45 YEARS                                      [  ]1 pt         [  ]1 pt    HISTORY OF PREADOLESCENT   SEXUAL ABUSE                                                             [  ]3 pts        [  ]0pts    PSYCHOLOGICAL DISEASE                     ADD, OCD, Bipolar, Schizophrenia        [  ]2 pts         [  ]2 pts                      Depression                                               [  ]1 pt           [  ]1 pt           SCORING TOTAL   (add numbers and type here)              ( 0 )                                     A score of 3 or lower indicated LOW risk for future opioid abuse  A score of 4 to 7 indicated moderate risk for future opioid abuse  A score of 8 or higher indicates a high risk for opioid abuse    GRACIELAI VTE 2.0 SCORE [CLOT updated 2019]    AGE RELATED RISK FACTORS                                                       MOBILITY RELATED FACTORS  [ ] Age 41-60 years                                            (1 Point)                    [ ] Bed rest                                                        (1 Point)  [x ] Age: 61-74 years                                           (2 Points)                  [ ] Plaster cast                                                   (2 Points)  [ ] Age= 75 years                                              (3 Points)                    [ ] Bed bound for more than 72 hours                 (2 Points)    DISEASE RELATED RISK FACTORS                                               GENDER SPECIFIC FACTORS  [ ] Edema in the lower extremities                       (1 Point)              [ ] Pregnancy                                                     (1 Point)  [ ] Varicose veins                                               (1 Point)                     [ ] Post-partum < 6 weeks                                   (1 Point)             [x ] BMI > 25 Kg/m2                                            (1 Point)                     [ ] Hormonal therapy  or oral contraception          (1 Point)                 [ ] Sepsis (in the previous month)                        (1 Point)               [ ] History of pregnancy complications                 (1 point)  [ ] Pneumonia or serious lung disease                                               [ ] Unexplained or recurrent                     (1 Point)           (in the previous month)                               (1 Point)  [ ] Abnormal pulmonary function test                     (1 Point)                 SURGERY RELATED RISK FACTORS  [ ] Acute myocardial infarction                              (1 Point)               [ ]  Section                                             (1 Point)  [ ] Congestive heart failure (in the previous month)  (1 Point)      [ ] Minor surgery                                                  (1 Point)   [ ] Inflammatory bowel disease                             (1 Point)               [ ] Arthroscopic surgery                                        (2 Points)  [ ] Central venous access                                      (2 Points)                [ x] General surgery lasting more than 45 minutes (2 points)  [ ] Malignancy- Present or previous                   (2 Points)                [ ] Elective arthroplasty                                         (5 points)    [ ] Stroke (in the previous month)                          (5 Points)                                                                                                                                                           HEMATOLOGY RELATED FACTORS                                                 TRAUMA RELATED RISK FACTORS  [ ] Prior episodes of VTE                                     (3 Points)                [ ] Fracture of the hip, pelvis, or leg                       (5 Points)  [ ] Positive family history for VTE                         (3 Points)             [ ] Acute spinal cord injury (in the previous month)  (5 Points)  [ ] Prothrombin 04482 A                                     (3 Points)               [ ] Paralysis  (less than 1 month)                             (5 Points)  [ ] Factor V Leiden                                             (3 Points)                  [ ] Multiple Trauma within 1 month                        (5 Points)  [ ] Lupus anticoagulants                                     (3 Points)                                                           [ ] Anticardiolipin antibodies                               (3 Points)                                                       [ ] High homocysteine in the blood                      (3 Points)                                             [ ] Other congenital or acquired thrombophilia      (3 Points)                                                [ ] Heparin induced thrombocytopenia                  (3 Points)                                     Total Score [     5     ]

## 2022-04-14 RX ORDER — OMEPRAZOLE 10 MG/1
0 CAPSULE, DELAYED RELEASE ORAL
Qty: 0 | Refills: 0 | DISCHARGE

## 2022-04-20 NOTE — PHARMACOTHERAPY INTERVENTION NOTE - COMMENTS
Gastric bypass scheduled for 5/3/2022.  Patient medication reconciliation completed. Patient currently taking:     Home Medications:  levothyroxine 100 mcg (0.1 mg) oral tablet: 1 tab(s) orally once a day   omeprazole 40 mg oral delayed release capsule: 1 cap(s) orally once a day    Patient was instructed to use crushed, dissolvable, chewable, or liquid formulations of medications for 1 month. Patient was informed to take daily multivitamins post surgically. Patient reeducated on NSAID avoidance (ibuprofen, ASA, naproxen, aleve) as they increased risk of GI bleeding; may use APAP for mild pain otherwise contact prescriber for consult. Patient was informed on indications and directions for administration for hyoscyamine SL, acetaminophen liquid, ondansetron ODT, and omeprazole  Patient was instructed to take the medications as follows:     -Crush levothyroxine

## 2022-04-29 ENCOUNTER — NON-APPOINTMENT (OUTPATIENT)
Age: 62
End: 2022-04-29

## 2022-05-16 PROBLEM — K44.9 DIAPHRAGMATIC HERNIA WITHOUT OBSTRUCTION OR GANGRENE: Chronic | Status: ACTIVE | Noted: 2022-04-13

## 2022-05-16 PROBLEM — K21.9 GASTRO-ESOPHAGEAL REFLUX DISEASE WITHOUT ESOPHAGITIS: Chronic | Status: ACTIVE | Noted: 2022-04-13

## 2022-05-16 PROBLEM — E66.01 MORBID (SEVERE) OBESITY DUE TO EXCESS CALORIES: Chronic | Status: ACTIVE | Noted: 2022-04-13

## 2022-05-26 ENCOUNTER — OUTPATIENT (OUTPATIENT)
Dept: OUTPATIENT SERVICES | Facility: HOSPITAL | Age: 62
LOS: 1 days | End: 2022-05-26
Payer: COMMERCIAL

## 2022-05-26 VITALS
HEIGHT: 61 IN | RESPIRATION RATE: 20 BRPM | OXYGEN SATURATION: 95 % | TEMPERATURE: 98 F | SYSTOLIC BLOOD PRESSURE: 136 MMHG | HEART RATE: 64 BPM | DIASTOLIC BLOOD PRESSURE: 74 MMHG | WEIGHT: 238.1 LBS

## 2022-05-26 DIAGNOSIS — Z91.89 OTHER SPECIFIED PERSONAL RISK FACTORS, NOT ELSEWHERE CLASSIFIED: ICD-10-CM

## 2022-05-26 DIAGNOSIS — Z98.89 OTHER SPECIFIED POSTPROCEDURAL STATES: Chronic | ICD-10-CM

## 2022-05-26 DIAGNOSIS — K44.9 DIAPHRAGMATIC HERNIA WITHOUT OBSTRUCTION OR GANGRENE: ICD-10-CM

## 2022-05-26 DIAGNOSIS — K57.92 DIVERTICULITIS OF INTESTINE, PART UNSPECIFIED, WITHOUT PERFORATION OR ABSCESS WITHOUT BLEEDING: ICD-10-CM

## 2022-05-26 DIAGNOSIS — Z01.818 ENCOUNTER FOR OTHER PREPROCEDURAL EXAMINATION: ICD-10-CM

## 2022-05-26 DIAGNOSIS — E03.9 HYPOTHYROIDISM, UNSPECIFIED: ICD-10-CM

## 2022-05-26 DIAGNOSIS — E66.01 MORBID (SEVERE) OBESITY DUE TO EXCESS CALORIES: ICD-10-CM

## 2022-05-26 DIAGNOSIS — Z98.890 OTHER SPECIFIED POSTPROCEDURAL STATES: Chronic | ICD-10-CM

## 2022-05-26 DIAGNOSIS — G47.33 OBSTRUCTIVE SLEEP APNEA (ADULT) (PEDIATRIC): ICD-10-CM

## 2022-05-26 DIAGNOSIS — Z90.710 ACQUIRED ABSENCE OF BOTH CERVIX AND UTERUS: Chronic | ICD-10-CM

## 2022-05-26 DIAGNOSIS — K21.9 GASTRO-ESOPHAGEAL REFLUX DISEASE WITHOUT ESOPHAGITIS: ICD-10-CM

## 2022-05-26 LAB
A1C WITH ESTIMATED AVERAGE GLUCOSE RESULT: 5.9 % — HIGH (ref 4–5.6)
ALBUMIN SERPL ELPH-MCNC: 3.9 G/DL — SIGNIFICANT CHANGE UP (ref 3.3–5.2)
ALP SERPL-CCNC: 98 U/L — SIGNIFICANT CHANGE UP (ref 40–120)
ALT FLD-CCNC: 27 U/L — SIGNIFICANT CHANGE UP
AMYLASE P1 CFR SERPL: 35 U/L — LOW (ref 36–128)
ANION GAP SERPL CALC-SCNC: 12 MMOL/L — SIGNIFICANT CHANGE UP (ref 5–17)
AST SERPL-CCNC: 26 U/L — SIGNIFICANT CHANGE UP
BASOPHILS # BLD AUTO: 0.09 K/UL — SIGNIFICANT CHANGE UP (ref 0–0.2)
BASOPHILS NFR BLD AUTO: 1.5 % — SIGNIFICANT CHANGE UP (ref 0–2)
BILIRUB SERPL-MCNC: 0.5 MG/DL — SIGNIFICANT CHANGE UP (ref 0.4–2)
BLD GP AB SCN SERPL QL: SIGNIFICANT CHANGE UP
BUN SERPL-MCNC: 10.1 MG/DL — SIGNIFICANT CHANGE UP (ref 8–20)
CALCIUM SERPL-MCNC: 9.1 MG/DL — SIGNIFICANT CHANGE UP (ref 8.6–10.2)
CHLORIDE SERPL-SCNC: 106 MMOL/L — SIGNIFICANT CHANGE UP (ref 98–107)
CO2 SERPL-SCNC: 24 MMOL/L — SIGNIFICANT CHANGE UP (ref 22–29)
CREAT SERPL-MCNC: 0.61 MG/DL — SIGNIFICANT CHANGE UP (ref 0.5–1.3)
EGFR: 101 ML/MIN/1.73M2 — SIGNIFICANT CHANGE UP
EOSINOPHIL # BLD AUTO: 0.22 K/UL — SIGNIFICANT CHANGE UP (ref 0–0.5)
EOSINOPHIL NFR BLD AUTO: 3.6 % — SIGNIFICANT CHANGE UP (ref 0–6)
ESTIMATED AVERAGE GLUCOSE: 123 MG/DL — HIGH (ref 68–114)
GLUCOSE SERPL-MCNC: 97 MG/DL — SIGNIFICANT CHANGE UP (ref 70–99)
HCT VFR BLD CALC: 41.8 % — SIGNIFICANT CHANGE UP (ref 34.5–45)
HGB BLD-MCNC: 13.8 G/DL — SIGNIFICANT CHANGE UP (ref 11.5–15.5)
IMM GRANULOCYTES NFR BLD AUTO: 0.3 % — SIGNIFICANT CHANGE UP (ref 0–1.5)
LIDOCAIN IGE QN: 38 U/L — SIGNIFICANT CHANGE UP (ref 22–51)
LYMPHOCYTES # BLD AUTO: 2.03 K/UL — SIGNIFICANT CHANGE UP (ref 1–3.3)
LYMPHOCYTES # BLD AUTO: 33.3 % — SIGNIFICANT CHANGE UP (ref 13–44)
MAGNESIUM SERPL-MCNC: 2.1 MG/DL — SIGNIFICANT CHANGE UP (ref 1.6–2.6)
MCHC RBC-ENTMCNC: 30.6 PG — SIGNIFICANT CHANGE UP (ref 27–34)
MCHC RBC-ENTMCNC: 33 GM/DL — SIGNIFICANT CHANGE UP (ref 32–36)
MCV RBC AUTO: 92.7 FL — SIGNIFICANT CHANGE UP (ref 80–100)
MONOCYTES # BLD AUTO: 0.66 K/UL — SIGNIFICANT CHANGE UP (ref 0–0.9)
MONOCYTES NFR BLD AUTO: 10.8 % — SIGNIFICANT CHANGE UP (ref 2–14)
NEUTROPHILS # BLD AUTO: 3.07 K/UL — SIGNIFICANT CHANGE UP (ref 1.8–7.4)
NEUTROPHILS NFR BLD AUTO: 50.5 % — SIGNIFICANT CHANGE UP (ref 43–77)
PHOSPHATE SERPL-MCNC: 3.1 MG/DL — SIGNIFICANT CHANGE UP (ref 2.4–4.7)
PLATELET # BLD AUTO: 256 K/UL — SIGNIFICANT CHANGE UP (ref 150–400)
POTASSIUM SERPL-MCNC: 4.2 MMOL/L — SIGNIFICANT CHANGE UP (ref 3.5–5.3)
POTASSIUM SERPL-SCNC: 4.2 MMOL/L — SIGNIFICANT CHANGE UP (ref 3.5–5.3)
PROT SERPL-MCNC: 7.3 G/DL — SIGNIFICANT CHANGE UP (ref 6.6–8.7)
RBC # BLD: 4.51 M/UL — SIGNIFICANT CHANGE UP (ref 3.8–5.2)
RBC # FLD: 13 % — SIGNIFICANT CHANGE UP (ref 10.3–14.5)
SODIUM SERPL-SCNC: 142 MMOL/L — SIGNIFICANT CHANGE UP (ref 135–145)
WBC # BLD: 6.09 K/UL — SIGNIFICANT CHANGE UP (ref 3.8–10.5)
WBC # FLD AUTO: 6.09 K/UL — SIGNIFICANT CHANGE UP (ref 3.8–10.5)

## 2022-05-26 PROCEDURE — 71046 X-RAY EXAM CHEST 2 VIEWS: CPT

## 2022-05-26 PROCEDURE — 71046 X-RAY EXAM CHEST 2 VIEWS: CPT | Mod: 26

## 2022-05-26 PROCEDURE — G0463: CPT

## 2022-05-26 RX ORDER — CEFAZOLIN SODIUM 1 G
2000 VIAL (EA) INJECTION ONCE
Refills: 0 | Status: DISCONTINUED | OUTPATIENT
Start: 2022-06-08 | End: 2022-06-09

## 2022-05-26 RX ORDER — OMEPRAZOLE 10 MG/1
1 CAPSULE, DELAYED RELEASE ORAL
Qty: 0 | Refills: 0 | DISCHARGE

## 2022-05-26 NOTE — H&P PST ADULT - ASSESSMENT
62 yo F PMH of hypothyroid, TARA on CPAP, GERD completed omeprazole 40 mg x 8 weeks with no significant change, obesity (BMI 45), s/p gastric sleeve 2016 lost 40 lbs, patient reports she tried weight watchers, , exercise over the years without meaningful success, 28 years ago craniotomy excision of acoustic neuroma, has weight in left upper eyelid, and hearing loss in left ear. Describes mid gastric bulge and pressure, 5/10, sitting makes it worse, no relief noted when lying down, denies nausea, vomiting, or coughing.  Follow surgeons diet orders prior to procedure, AM procedure do not  take any medication. Presents for preop assessment prior to robotic gastric bypass, cholecystectomy, possible hiatal hernia repair w/Dr Glass scheduled for 2022.  Patient  was originally scheduled for this procedure on 5/3/2022,        Clearance pending from Dr. Northorn CAPRINI SCORE    AGE RELATED RISK FACTORS                                                             [ ] Age 41-60 years                                            (1 Point)  [x ] Age: 61-74 years                                           (2 Points)                 [ ] Age= 75 years                                                (3 Points)             DISEASE RELATED RISK FACTORS                                                       [ ] Edema in the lower extremities                 (1 Point)                     [ ] Varicose veins                                               (1 Point)                                 [x ] BMI > 25 Kg/m2                                            (1 Point)                                  [ ] Serious infection (ie PNA)                            (1 Point)                     [ ] Lung disease ( COPD, Emphysema)            (1 Point)                                                                          [ ] Acute myocardial infarction                         (1 Point)                  [ ] Congestive heart failure (in the previous month)  (1 Point)         [x ] Inflammatory bowel disease                            (1 Point)                  [ ] Central venous access, PICC or Port               (2 points)       (within the last month)                                                                [ ] Stroke (in the previous month)                        (5 Points)    [ ] Previous or present malignancy                       (2 points)                                                                                                                                                         HEMATOLOGY RELATED FACTORS                                                         [ ] Prior episodes of VTE                                     (3 Points)                     [ ] Positive family history for VTE                      (3 Points)                  [ ] Prothrombin 15333 A                                     (3 Points)                     [ ] Factor V Leiden                                                (3 Points)                        [ ] Lupus anticoagulants                                      (3 Points)                                                           [ ] Anticardiolipin antibodies                              (3 Points)                                                       [ ] High homocysteine in the blood                   (3 Points)                                             [ ] Other congenital or acquired thrombophilia      (3 Points)                                                [ ] Heparin induced thrombocytopenia                  (3 Points)                                        MOBILITY RELATED FACTORS  [ ] Bed rest                                                         (1 Point)  [ ] Plaster cast                                                    (2 points)  [ ] Bed bound for more than 72 hours           (2 Points)    GENDER SPECIFIC FACTORS  [ ] Pregnancy or had a baby within the last month   (1 Point)  [ ] Post-partum < 6 weeks                                   (1 Point)  [ ] Hormonal therapy  or oral contraception   (1 Point)  [ ] History of pregnancy complications              (1 point)  [ ] Unexplained or recurrent              (1 Point)    OTHER RISK FACTORS                                           (1 Point)  [ x] BMI >40, smoking, diabetes requiring insulin, chemotherapy  blood transfusions and length of surgery over 2 hours    SURGERY RELATED RISK FACTORS  [ ]  Section within the last month     (1 Point)  [ ] Minor surgery                                                  (1 Point)  [ ] Arthroscopic surgery                                       (2 Points)  [ x] Planned major surgery lasting more            (2 Points)      than 45 minutes     [ ] Elective hip or knee joint replacement       (5 points)       surgery                                                TRAUMA RELATED RISK FACTORS  [ ] Fracture of the hip, pelvis, or leg                       (5 Points)  [ ] Spinal cord injury resulting in paralysis             (5 points)       (in the previous month)    [ ] Paralysis  (less than 1 month)                             (5 Points)  [ ] Multiple Trauma within 1 month                        (5 Points)    Total Score [    7    ]    Caprini Score 0-2: Low Risk, NO VTE prophylaxis required for most patients, encourage ambulation  Caprini Score 3-6: Moderate Risk , pharmacologic VTE prophylaxis is indicated for most patients (in the absence of contraindications)  Caprini Score Greater than or =7: High risk, pharmocologic VTE prophylaxis indicated for most patients (in the absence of contraindications)    OPIOID RISK TOOL    FRANCISCA EACH BOX THAT APPLIES AND ADD TOTALS AT THE END    FAMILY HISTORY OF SUBSTANCE ABUSE                 FEMALE         MALE                                                Alcohol                             [  ]1 pt          [  ]3pts                                               Illegal Durgs                     [  ]2 pts        [  ]3pts                                               Rx Drugs                           [  ]4 pts        [  ]4 pts    PERSONAL HISTORY OF SUBSTANCE ABUSE                                                                                          Alcohol                             [  ]3 pts       [  ]3 pts                                               Illegal Durgs                     [  ]4 pts        [  ]4 pts                                               Rx Drugs                           [  ]5 pts        [  ]5 pts    AGE BETWEEN 16-45 YEARS                                      [  ]1 pt         [  ]1 pt    HISTORY OF PREADOLESCENT   SEXUAL ABUSE                                                             [  ]3 pts        [  ]0pts    PSYCHOLOGICAL DISEASE                     ADD, OCD, Bipolar, Schizophrenia        [  ]2 pts         [  ]2 pts                      Depression                                               [  ]1 pt           [  ]1 pt           SCORING TOTAL   (add numbers and type here)              (0)                                     A score of 3 or lower indicated LOW risk for future opiod abuse  A score of 4 to 7 indicated moderate risk for future opiod abuse  A score of 8 or higher indicates a high risk for opiod abuse                                 60 yo F PMH of hypothyroid, TARA on CPAP, GERD completed omeprazole 40 mg x 8 weeks with no significant change, obesity (BMI 45), s/p gastric sleeve 2016 lost 40 lbs, patient reports she tried weight watchers, , exercise over the years without meaningful success, 28 years ago craniotomy excision of acoustic neuroma, has weight in left upper eyelid, and hearing loss in left ear. Describes mid gastric bulge and pressure, 5/10, sitting makes it worse, no relief noted when lying down, denies nausea, vomiting, or coughing.  Follow surgeons diet orders prior to procedure, AM procedure do not  take any medication. Presents for preop assessment prior to robotic gastric bypass, cholecystectomy, possible hiatal hernia repair w/Dr Glass scheduled for 2022.  Patient  was originally scheduled for this procedure on 5/3/2022,    Confirmation patient has Incentive spirometer, verbalized understanding of how to use it.           Clearance pending from Dr. Northorn CAPRINI SCORE    AGE RELATED RISK FACTORS                                                             [ ] Age 41-60 years                                            (1 Point)  [x ] Age: 61-74 years                                           (2 Points)                 [ ] Age= 75 years                                                (3 Points)             DISEASE RELATED RISK FACTORS                                                       [ ] Edema in the lower extremities                 (1 Point)                     [ ] Varicose veins                                               (1 Point)                                 [x ] BMI > 25 Kg/m2                                            (1 Point)                                  [ ] Serious infection (ie PNA)                            (1 Point)                     [ ] Lung disease ( COPD, Emphysema)            (1 Point)                                                                          [ ] Acute myocardial infarction                         (1 Point)                  [ ] Congestive heart failure (in the previous month)  (1 Point)         [x ] Inflammatory bowel disease                            (1 Point)                  [ ] Central venous access, PICC or Port               (2 points)       (within the last month)                                                                [ ] Stroke (in the previous month)                        (5 Points)    [ ] Previous or present malignancy                       (2 points)                                                                                                                                                         HEMATOLOGY RELATED FACTORS                                                         [ ] Prior episodes of VTE                                     (3 Points)                     [ ] Positive family history for VTE                      (3 Points)                  [ ] Prothrombin 72265 A                                     (3 Points)                     [ ] Factor V Leiden                                                (3 Points)                        [ ] Lupus anticoagulants                                      (3 Points)                                                           [ ] Anticardiolipin antibodies                              (3 Points)                                                       [ ] High homocysteine in the blood                   (3 Points)                                             [ ] Other congenital or acquired thrombophilia      (3 Points)                                                [ ] Heparin induced thrombocytopenia                  (3 Points)                                        MOBILITY RELATED FACTORS  [ ] Bed rest                                                         (1 Point)  [ ] Plaster cast                                                    (2 points)  [ ] Bed bound for more than 72 hours           (2 Points)    GENDER SPECIFIC FACTORS  [ ] Pregnancy or had a baby within the last month   (1 Point)  [ ] Post-partum < 6 weeks                                   (1 Point)  [ ] Hormonal therapy  or oral contraception   (1 Point)  [ ] History of pregnancy complications              (1 point)  [ ] Unexplained or recurrent              (1 Point)    OTHER RISK FACTORS                                           (1 Point)  [ x] BMI >40, smoking, diabetes requiring insulin, chemotherapy  blood transfusions and length of surgery over 2 hours    SURGERY RELATED RISK FACTORS  [ ]  Section within the last month     (1 Point)  [ ] Minor surgery                                                  (1 Point)  [ ] Arthroscopic surgery                                       (2 Points)  [ x] Planned major surgery lasting more            (2 Points)      than 45 minutes     [ ] Elective hip or knee joint replacement       (5 points)       surgery                                                TRAUMA RELATED RISK FACTORS  [ ] Fracture of the hip, pelvis, or leg                       (5 Points)  [ ] Spinal cord injury resulting in paralysis             (5 points)       (in the previous month)    [ ] Paralysis  (less than 1 month)                             (5 Points)  [ ] Multiple Trauma within 1 month                        (5 Points)    Total Score [    7    ]    Caprini Score 0-2: Low Risk, NO VTE prophylaxis required for most patients, encourage ambulation  Caprini Score 3-6: Moderate Risk , pharmacologic VTE prophylaxis is indicated for most patients (in the absence of contraindications)  Caprini Score Greater than or =7: High risk, pharmocologic VTE prophylaxis indicated for most patients (in the absence of contraindications)    OPIOID RISK TOOL    FRANCISCA EACH BOX THAT APPLIES AND ADD TOTALS AT THE END    FAMILY HISTORY OF SUBSTANCE ABUSE                 FEMALE         MALE                                                Alcohol                             [  ]1 pt          [  ]3pts                                               Illegal Durgs                     [  ]2 pts        [  ]3pts                                               Rx Drugs                           [  ]4 pts        [  ]4 pts    PERSONAL HISTORY OF SUBSTANCE ABUSE                                                                                          Alcohol                             [  ]3 pts       [  ]3 pts                                               Illegal Durgs                     [  ]4 pts        [  ]4 pts                                               Rx Drugs                           [  ]5 pts        [  ]5 pts    AGE BETWEEN 16-45 YEARS                                      [  ]1 pt         [  ]1 pt    HISTORY OF PREADOLESCENT   SEXUAL ABUSE                                                             [  ]3 pts        [  ]0pts    PSYCHOLOGICAL DISEASE                     ADD, OCD, Bipolar, Schizophrenia        [  ]2 pts         [  ]2 pts                      Depression                                               [  ]1 pt           [  ]1 pt           SCORING TOTAL   (add numbers and type here)              (0)                                     A score of 3 or lower indicated LOW risk for future opiod abuse  A score of 4 to 7 indicated moderate risk for future opiod abuse  A score of 8 or higher indicates a high risk for opiod abuse                               62 yo F PMH of hypothyroid, TARA on CPAP, GERD, obesity (BMI 45), s/p gastric sleeve 2016 lost 40 lbs, 28 years ago craniotomy for excision of acoustic neuroma with residual hearing loss in left ear. Patient presents for revision of gastric sleeve. Patient reports she tried weight watchers and exercise over the years without meaningful success. Reports a mid gastric bulge and pressure, 5/10 in severity, sitting makes it worse, no relief noted when lying down, denies nausea, vomiting, or coughing. Abdomen is soft, tenderness to epigastric area, bowel sounds normal, no organomegaly. Presents for preop assessment prior to robotic gastric bypass, cholecystectomy, possible hiatal hernia repair w/Dr Glass scheduled for 2022.  Patient  was originally scheduled for this procedure on 5/3/2022, had positive covid pcr on 2022 at pcp office, patient states she received monoclonal antibody infusion, fully recovered. Patient educated on surgical scrub, preadmission instructions, medical clearance and day of procedure medications, verbalizes understanding. Pt instructed to stop vitamins/supplements/herbal medications/ASA/NSAIDS for one week prior to surgery and discuss with PMD. Follow liquid instructions from surgeons office, patient verbalized understanding. Confirmation patient has Incentive spirometer, verbalized understanding of how to use it.           CAPRINI SCORE    AGE RELATED RISK FACTORS                                                             [ ] Age 41-60 years                                            (1 Point)  [x ] Age: 61-74 years                                           (2 Points)                 [ ] Age= 75 years                                                (3 Points)             DISEASE RELATED RISK FACTORS                                                       [ ] Edema in the lower extremities                 (1 Point)                     [ ] Varicose veins                                               (1 Point)                                 [x ] BMI > 25 Kg/m2                                            (1 Point)                                  [ ] Serious infection (ie PNA)                            (1 Point)                     [ ] Lung disease ( COPD, Emphysema)            (1 Point)                                                                          [ ] Acute myocardial infarction                         (1 Point)                  [ ] Congestive heart failure (in the previous month)  (1 Point)         [x ] Inflammatory bowel disease                            (1 Point)                  [ ] Central venous access, PICC or Port               (2 points)       (within the last month)                                                                [ ] Stroke (in the previous month)                        (5 Points)    [ ] Previous or present malignancy                       (2 points)                                                                                                                                                         HEMATOLOGY RELATED FACTORS                                                         [ ] Prior episodes of VTE                                     (3 Points)                     [ ] Positive family history for VTE                      (3 Points)                  [ ] Prothrombin 50254 A                                     (3 Points)                     [ ] Factor V Leiden                                                (3 Points)                        [ ] Lupus anticoagulants                                      (3 Points)                                                           [ ] Anticardiolipin antibodies                              (3 Points)                                                       [ ] High homocysteine in the blood                   (3 Points)                                             [ ] Other congenital or acquired thrombophilia      (3 Points)                                                [ ] Heparin induced thrombocytopenia                  (3 Points)                                        MOBILITY RELATED FACTORS  [ ] Bed rest                                                         (1 Point)  [ ] Plaster cast                                                    (2 points)  [ ] Bed bound for more than 72 hours           (2 Points)    GENDER SPECIFIC FACTORS  [ ] Pregnancy or had a baby within the last month   (1 Point)  [ ] Post-partum < 6 weeks                                   (1 Point)  [ ] Hormonal therapy  or oral contraception   (1 Point)  [ ] History of pregnancy complications              (1 point)  [ ] Unexplained or recurrent              (1 Point)    OTHER RISK FACTORS                                           (1 Point)  [ x] BMI >40, smoking, diabetes requiring insulin, chemotherapy  blood transfusions and length of surgery over 2 hours    SURGERY RELATED RISK FACTORS  [ ]  Section within the last month     (1 Point)  [ ] Minor surgery                                                  (1 Point)  [ ] Arthroscopic surgery                                       (2 Points)  [ x] Planned major surgery lasting more            (2 Points)      than 45 minutes     [ ] Elective hip or knee joint replacement       (5 points)       surgery                                                TRAUMA RELATED RISK FACTORS  [ ] Fracture of the hip, pelvis, or leg                       (5 Points)  [ ] Spinal cord injury resulting in paralysis             (5 points)       (in the previous month)    [ ] Paralysis  (less than 1 month)                             (5 Points)  [ ] Multiple Trauma within 1 month                        (5 Points)    Total Score [    7    ]    Caprini Score 0-2: Low Risk, NO VTE prophylaxis required for most patients, encourage ambulation  Caprini Score 3-6: Moderate Risk , pharmacologic VTE prophylaxis is indicated for most patients (in the absence of contraindications)  Caprini Score Greater than or =7: High risk, pharmocologic VTE prophylaxis indicated for most patients (in the absence of contraindications)    OPIOID RISK TOOL    FRANCISCA EACH BOX THAT APPLIES AND ADD TOTALS AT THE END    FAMILY HISTORY OF SUBSTANCE ABUSE                 FEMALE         MALE                                                Alcohol                             [  ]1 pt          [  ]3pts                                               Illegal Durgs                     [  ]2 pts        [  ]3pts                                               Rx Drugs                           [  ]4 pts        [  ]4 pts    PERSONAL HISTORY OF SUBSTANCE ABUSE                                                                                          Alcohol                             [  ]3 pts       [  ]3 pts                                               Illegal Durgs                     [  ]4 pts        [  ]4 pts                                               Rx Drugs                           [  ]5 pts        [  ]5 pts    AGE BETWEEN 16-45 YEARS                                      [  ]1 pt         [  ]1 pt    HISTORY OF PREADOLESCENT   SEXUAL ABUSE                                                             [  ]3 pts        [  ]0pts    PSYCHOLOGICAL DISEASE                     ADD, OCD, Bipolar, Schizophrenia        [  ]2 pts         [  ]2 pts                      Depression                                               [  ]1 pt           [  ]1 pt           SCORING TOTAL   (add numbers and type here)              (0)                                     A score of 3 or lower indicated LOW risk for future opiod abuse  A score of 4 to 7 indicated moderate risk for future opiod abuse  A score of 8 or higher indicates a high risk for opiod abuse

## 2022-05-26 NOTE — H&P PST ADULT - LAST ECHOCARDIOGRAM
IUD Insertion Procedure Note:    Indication: Heavy painful periods    Risks and Benefits:  The risks, benefits and alternatives to IUD placement were discussed.  The possible complications including but not limited to infection, ectopic pregnancies and possible changes in menstrual cycles were discussed. The insertion procedure was reviewed with the patient.  All questions were answered and the patient gave written consent for the procedure.     Procedure: The patient was appropriately positioned.  A speculum was placed in the vagina with good visualization of the cervix.  The cervix was swabbed with Betadine solution.  A Paracervical block was given with 9 cc 1% lidocaine. The anterior lip of the cervix was grasped with a single toothed tenaculum.   A Mirena device was inserted through the cervical os to the level of the fundus.  The plunger was pulled back ejecting the IUD into the uterine cavity.  .    The patient tolerated the procedure well, but experienced some dizziness and cramping after. She took some Ibuprofen and juice.  Heating pad given.  BP recheck 110/80.     Complications: Post procedure pain.   done

## 2022-05-26 NOTE — H&P PST ADULT - PROBLEM SELECTOR PLAN 3
Avoid seeds, and nuts, an continue liquid diet. Continue to use CPAP, instructed to bring with her day of surgery, TARA precautions

## 2022-05-26 NOTE — H&P PST ADULT - NEGATIVE CARDIOVASCULAR SYMPTOMS
no chest pain/no palpitations/no dyspnea on exertion/no claudication no chest pain/no palpitations/no dyspnea on exertion/no orthopnea/no paroxysmal nocturnal dyspnea/no peripheral edema/no claudication

## 2022-05-26 NOTE — H&P PST ADULT - NSICDXPASTSURGICALHX_GEN_ALL_CORE_FT
PAST SURGICAL HISTORY:  H/O abdominoplasty     H/O colonoscopy     H/O endoscopy     H/O hernia repair x2    H/O: hysterectomy 2004 due to fibroids    History of facial surgery 1986, facial reconstruction after excision of acoustic neuroma    S/P colectomy 10/27/2014    S/P excision of acoustic neuroma 1986

## 2022-05-26 NOTE — H&P PST ADULT - PROBLEM SELECTOR PLAN 1
Scheduled robotic gastric bypass, cholecystectomy, possible hiatal hernia repair w/Dr Glass scheduled for 6/8/2022 Scheduled robotic gastric bypass, cholecystectomy, possible hiatal hernia repair w/Dr Glass scheduled for 6/8/2022. Medical evaluation pending

## 2022-05-26 NOTE — H&P PST ADULT - NEGATIVE ENMT SYMPTOMS
no dysphagia no ear pain/no tinnitus/no vertigo/no nasal congestion/no nasal discharge/no nose bleeds/no dysphagia

## 2022-05-26 NOTE — H&P PST ADULT - GASTROINTESTINAL DETAILS
soft soft/no distention/bowel sounds normal/no bruit/no rebound tenderness/no guarding/no rigidity/no organomegaly

## 2022-05-26 NOTE — H&P PST ADULT - NEGATIVE OPHTHALMOLOGIC SYMPTOMS
wears glasses/no diplopia/no lacrimation L/no lacrimation R/no blurred vision L/no blurred vision R/no pain L/no pain R/no irritation L/no irritation R/no loss of vision L/no loss of vision R/no scleral injection L/no scleral injection R

## 2022-05-26 NOTE — H&P PST ADULT - PROBLEM SELECTOR PLAN 2
Continue levothyroxine 100 mcg daily. Continue levothyroxine 100 mcg daily. Medical evaluation pending

## 2022-05-26 NOTE — H&P PST ADULT - MALLAMPATI CLASS
left sided facial paralysis s/p surgery for acoustic neuroma removal, tongue deviates to right/Class IV (difficult) - the soft palate is not visible at all left sided facial paralysis s/p surgery for acoustic neuroma removal, tongue deviates to right/Class II - visualization of the soft palate, fauces, and uvula

## 2022-05-26 NOTE — H&P PST ADULT - GIT ABD PE PAL DETAILS PC
Mid gastric bulge and tenderness 5/10/tender/bowel sounds hyperactive Mid epigastric bulge and tenderness/tender/bowel sounds hyperactive

## 2022-05-26 NOTE — H&P PST ADULT - PROBLEM SELECTOR PLAN 7
Caprini Score is 7    Caprini Score Greater than or =7: is considered a  High risk, pharmocologic VTE prophylaxis indicated for most patients (in the absence of contraindications)

## 2022-05-26 NOTE — H&P PST ADULT - PROBLEM SELECTOR PLAN 5
Scheduled robotic gastric bypass, cholecystectomy, possible hiatal hernia repair w/Dr Glass scheduled for 6/8/2022

## 2022-05-26 NOTE — H&P PST ADULT - PROBLEM SELECTOR PLAN 6
Scheduled robotic gastric bypass, cholecystectomy, possible hiatal hernia repair w/Dr Glass scheduled for 6/8/2022 Caprini Score is 7    Caprini Score Greater than or =7: is considered a  High risk, pharmocologic VTE prophylaxis indicated for most patients (in the absence of contraindications)

## 2022-05-26 NOTE — H&P PST ADULT - ACTIVITY
ADLs, some walking ADLs, some walking, no formal exercise, can go up 2 flights of stairs without symptoms

## 2022-05-26 NOTE — H&P PST ADULT - PROBLEM SELECTOR PROBLEM 4
Obstructive sleep apnea treated with continuous positive airway pressure (CPAP) GERD (gastroesophageal reflux disease)

## 2022-05-26 NOTE — H&P PST ADULT - NEGATIVE NEUROLOGICAL SYMPTOMS
numbness left side of face/no weakness/no generalized seizures/no focal seizures/no syncope/no vertigo/no loss of sensation numbness on left side of face/no weakness/no generalized seizures/no focal seizures/no syncope/no vertigo/no loss of sensation

## 2022-05-26 NOTE — H&P PST ADULT - CRANIAL NERVE
Deficit on left side of face, hearing loss, and deviation of tongue to right side,  cranial nerves V, VII, VII, XII left facial droop, hearing loss left ear, and deviation of tongue to right side,  cranial nerves V, VII, VII, XII

## 2022-05-26 NOTE — H&P PST ADULT - EYES
detailed exam Left upper eyelid is weighted/PERRL eyelid weight is visualized on left central upper eyelid/PERRL/EOMI/conjunctiva clear

## 2022-05-26 NOTE — H&P PST ADULT - RS GEN PE MLT RESP DETAILS PC
normal/airway patent/breath sounds equal/good air movement/clear to auscultation bilaterally/no chest wall tenderness airway patent/breath sounds equal/good air movement/clear to auscultation bilaterally/no chest wall tenderness/no rales/no rhonchi/no wheezes

## 2022-06-06 RX ORDER — ONDANSETRON 8 MG/1
1 TABLET, FILM COATED ORAL
Qty: 56 | Refills: 0
Start: 2022-06-06 | End: 2022-06-19

## 2022-06-06 RX ORDER — OMEPRAZOLE 10 MG/1
1 CAPSULE, DELAYED RELEASE ORAL
Qty: 30 | Refills: 0
Start: 2022-06-06 | End: 2022-07-05

## 2022-06-06 RX ORDER — HYOSCYAMINE SULFATE 0.13 MG
1 TABLET ORAL
Qty: 56 | Refills: 0
Start: 2022-06-06 | End: 2022-06-19

## 2022-06-06 RX ORDER — ACETAMINOPHEN 500 MG
30 TABLET ORAL
Qty: 150 | Refills: 0
Start: 2022-06-06 | End: 2022-06-10

## 2022-06-07 ENCOUNTER — TRANSCRIPTION ENCOUNTER (OUTPATIENT)
Age: 62
End: 2022-06-07

## 2022-06-08 ENCOUNTER — TRANSCRIPTION ENCOUNTER (OUTPATIENT)
Age: 62
End: 2022-06-08

## 2022-06-08 ENCOUNTER — APPOINTMENT (OUTPATIENT)
Dept: SURGERY | Facility: HOSPITAL | Age: 62
End: 2022-06-08

## 2022-06-08 ENCOUNTER — INPATIENT (INPATIENT)
Facility: HOSPITAL | Age: 62
LOS: 0 days | Discharge: HOME | DRG: 621 | End: 2022-06-09
Attending: SURGERY | Admitting: SURGERY
Payer: COMMERCIAL

## 2022-06-08 VITALS
DIASTOLIC BLOOD PRESSURE: 78 MMHG | RESPIRATION RATE: 16 BRPM | WEIGHT: 225.75 LBS | SYSTOLIC BLOOD PRESSURE: 138 MMHG | HEIGHT: 62 IN | OXYGEN SATURATION: 98 % | TEMPERATURE: 98 F | HEART RATE: 84 BPM

## 2022-06-08 DIAGNOSIS — Z98.89 OTHER SPECIFIED POSTPROCEDURAL STATES: Chronic | ICD-10-CM

## 2022-06-08 DIAGNOSIS — E66.01 MORBID (SEVERE) OBESITY DUE TO EXCESS CALORIES: ICD-10-CM

## 2022-06-08 DIAGNOSIS — Z98.890 OTHER SPECIFIED POSTPROCEDURAL STATES: Chronic | ICD-10-CM

## 2022-06-08 DIAGNOSIS — Z90.710 ACQUIRED ABSENCE OF BOTH CERVIX AND UTERUS: Chronic | ICD-10-CM

## 2022-06-08 LAB
ANION GAP SERPL CALC-SCNC: 16 MMOL/L — SIGNIFICANT CHANGE UP (ref 5–17)
BASOPHILS # BLD AUTO: 0.08 K/UL — SIGNIFICANT CHANGE UP (ref 0–0.2)
BASOPHILS NFR BLD AUTO: 0.7 % — SIGNIFICANT CHANGE UP (ref 0–2)
BUN SERPL-MCNC: 8.6 MG/DL — SIGNIFICANT CHANGE UP (ref 8–20)
CALCIUM SERPL-MCNC: 9 MG/DL — SIGNIFICANT CHANGE UP (ref 8.6–10.2)
CHLORIDE SERPL-SCNC: 101 MMOL/L — SIGNIFICANT CHANGE UP (ref 98–107)
CO2 SERPL-SCNC: 19 MMOL/L — LOW (ref 22–29)
CREAT SERPL-MCNC: 0.63 MG/DL — SIGNIFICANT CHANGE UP (ref 0.5–1.3)
EGFR: 100 ML/MIN/1.73M2 — SIGNIFICANT CHANGE UP
EOSINOPHIL # BLD AUTO: 0.03 K/UL — SIGNIFICANT CHANGE UP (ref 0–0.5)
EOSINOPHIL NFR BLD AUTO: 0.3 % — SIGNIFICANT CHANGE UP (ref 0–6)
GLUCOSE BLDC GLUCOMTR-MCNC: 94 MG/DL — SIGNIFICANT CHANGE UP (ref 70–99)
GLUCOSE SERPL-MCNC: 134 MG/DL — HIGH (ref 70–99)
HCT VFR BLD CALC: 41.7 % — SIGNIFICANT CHANGE UP (ref 34.5–45)
HGB BLD-MCNC: 14.1 G/DL — SIGNIFICANT CHANGE UP (ref 11.5–15.5)
IMM GRANULOCYTES NFR BLD AUTO: 0.8 % — SIGNIFICANT CHANGE UP (ref 0–1.5)
LYMPHOCYTES # BLD AUTO: 1.41 K/UL — SIGNIFICANT CHANGE UP (ref 1–3.3)
LYMPHOCYTES # BLD AUTO: 12.5 % — LOW (ref 13–44)
MCHC RBC-ENTMCNC: 31.3 PG — SIGNIFICANT CHANGE UP (ref 27–34)
MCHC RBC-ENTMCNC: 33.8 GM/DL — SIGNIFICANT CHANGE UP (ref 32–36)
MCV RBC AUTO: 92.7 FL — SIGNIFICANT CHANGE UP (ref 80–100)
MONOCYTES # BLD AUTO: 0.33 K/UL — SIGNIFICANT CHANGE UP (ref 0–0.9)
MONOCYTES NFR BLD AUTO: 2.9 % — SIGNIFICANT CHANGE UP (ref 2–14)
NEUTROPHILS # BLD AUTO: 9.38 K/UL — HIGH (ref 1.8–7.4)
NEUTROPHILS NFR BLD AUTO: 82.8 % — HIGH (ref 43–77)
PLATELET # BLD AUTO: 192 K/UL — SIGNIFICANT CHANGE UP (ref 150–400)
POTASSIUM SERPL-MCNC: 4.6 MMOL/L — SIGNIFICANT CHANGE UP (ref 3.5–5.3)
POTASSIUM SERPL-SCNC: 4.6 MMOL/L — SIGNIFICANT CHANGE UP (ref 3.5–5.3)
RBC # BLD: 4.5 M/UL — SIGNIFICANT CHANGE UP (ref 3.8–5.2)
RBC # FLD: 12.9 % — SIGNIFICANT CHANGE UP (ref 10.3–14.5)
SODIUM SERPL-SCNC: 136 MMOL/L — SIGNIFICANT CHANGE UP (ref 135–145)
WBC # BLD: 11.32 K/UL — HIGH (ref 3.8–10.5)
WBC # FLD AUTO: 11.32 K/UL — HIGH (ref 3.8–10.5)

## 2022-06-08 PROCEDURE — 43644 LAP GASTRIC BYPASS/ROUX-EN-Y: CPT | Mod: AS

## 2022-06-08 PROCEDURE — 43644 LAP GASTRIC BYPASS/ROUX-EN-Y: CPT

## 2022-06-08 PROCEDURE — 43200 ESOPHAGOSCOPY FLEXIBLE BRUSH: CPT

## 2022-06-08 PROCEDURE — S2900 ROBOTIC SURGICAL SYSTEM: CPT | Mod: NC

## 2022-06-08 DEVICE — CLIP APPLIER COVIDIEN ENDOCLIP 10MM LARGE: Type: IMPLANTABLE DEVICE | Status: FUNCTIONAL

## 2022-06-08 DEVICE — XI STAPLER SUREFORM RELOAD 60 GREEN: Type: IMPLANTABLE DEVICE | Status: FUNCTIONAL

## 2022-06-08 DEVICE — XI STAPLER SUREFORM RELOAD 60 BLUE: Type: IMPLANTABLE DEVICE | Status: FUNCTIONAL

## 2022-06-08 RX ORDER — CEFAZOLIN SODIUM 1 G
2000 VIAL (EA) INJECTION EVERY 8 HOURS
Refills: 0 | Status: DISCONTINUED | OUTPATIENT
Start: 2022-06-08 | End: 2022-06-08

## 2022-06-08 RX ORDER — PANTOPRAZOLE SODIUM 20 MG/1
40 TABLET, DELAYED RELEASE ORAL EVERY 24 HOURS
Refills: 0 | Status: DISCONTINUED | OUTPATIENT
Start: 2022-06-08 | End: 2022-06-09

## 2022-06-08 RX ORDER — METOCLOPRAMIDE HCL 10 MG
10 TABLET ORAL ONCE
Refills: 0 | Status: COMPLETED | OUTPATIENT
Start: 2022-06-08 | End: 2022-06-08

## 2022-06-08 RX ORDER — FENTANYL CITRATE 50 UG/ML
25 INJECTION INTRAVENOUS
Refills: 0 | Status: DISCONTINUED | OUTPATIENT
Start: 2022-06-08 | End: 2022-06-08

## 2022-06-08 RX ORDER — FENTANYL CITRATE 50 UG/ML
50 INJECTION INTRAVENOUS
Refills: 0 | Status: DISCONTINUED | OUTPATIENT
Start: 2022-06-08 | End: 2022-06-08

## 2022-06-08 RX ORDER — HEPARIN SODIUM 5000 [USP'U]/ML
5000 INJECTION INTRAVENOUS; SUBCUTANEOUS EVERY 8 HOURS
Refills: 0 | Status: DISCONTINUED | OUTPATIENT
Start: 2022-06-08 | End: 2022-06-09

## 2022-06-08 RX ORDER — SODIUM CHLORIDE 9 MG/ML
3 INJECTION INTRAMUSCULAR; INTRAVENOUS; SUBCUTANEOUS EVERY 8 HOURS
Refills: 0 | Status: DISCONTINUED | OUTPATIENT
Start: 2022-06-08 | End: 2022-06-08

## 2022-06-08 RX ORDER — ONDANSETRON 8 MG/1
4 TABLET, FILM COATED ORAL EVERY 6 HOURS
Refills: 0 | Status: DISCONTINUED | OUTPATIENT
Start: 2022-06-08 | End: 2022-06-09

## 2022-06-08 RX ORDER — ACETAMINOPHEN 500 MG
1000 TABLET ORAL ONCE
Refills: 0 | Status: COMPLETED | OUTPATIENT
Start: 2022-06-08 | End: 2022-06-08

## 2022-06-08 RX ORDER — SODIUM CHLORIDE 9 MG/ML
1000 INJECTION, SOLUTION INTRAVENOUS
Refills: 0 | Status: DISCONTINUED | OUTPATIENT
Start: 2022-06-08 | End: 2022-06-08

## 2022-06-08 RX ORDER — ACETAMINOPHEN 500 MG
1000 TABLET ORAL ONCE
Refills: 0 | Status: COMPLETED | OUTPATIENT
Start: 2022-06-09 | End: 2022-06-09

## 2022-06-08 RX ORDER — CEFAZOLIN SODIUM 1 G
2000 VIAL (EA) INJECTION EVERY 8 HOURS
Refills: 0 | Status: COMPLETED | OUTPATIENT
Start: 2022-06-08 | End: 2022-06-09

## 2022-06-08 RX ORDER — KETOROLAC TROMETHAMINE 30 MG/ML
15 SYRINGE (ML) INJECTION EVERY 6 HOURS
Refills: 0 | Status: DISCONTINUED | OUTPATIENT
Start: 2022-06-08 | End: 2022-06-09

## 2022-06-08 RX ORDER — BUPIVACAINE 13.3 MG/ML
20 INJECTION, SUSPENSION, LIPOSOMAL INFILTRATION ONCE
Refills: 0 | Status: DISCONTINUED | OUTPATIENT
Start: 2022-06-08 | End: 2022-06-08

## 2022-06-08 RX ORDER — INDOCYANINE GREEN 25 MG
2.5 KIT INTRAVASCULAR; INTRAVENOUS ONCE
Refills: 0 | Status: DISCONTINUED | OUTPATIENT
Start: 2022-06-08 | End: 2022-06-08

## 2022-06-08 RX ORDER — LEVOTHYROXINE SODIUM 125 MCG
100 TABLET ORAL DAILY
Refills: 0 | Status: DISCONTINUED | OUTPATIENT
Start: 2022-06-08 | End: 2022-06-09

## 2022-06-08 RX ORDER — HEPARIN SODIUM 5000 [USP'U]/ML
5000 INJECTION INTRAVENOUS; SUBCUTANEOUS ONCE
Refills: 0 | Status: COMPLETED | OUTPATIENT
Start: 2022-06-08 | End: 2022-06-08

## 2022-06-08 RX ORDER — ACETAMINOPHEN 500 MG
975 TABLET ORAL EVERY 8 HOURS
Refills: 0 | Status: DISCONTINUED | OUTPATIENT
Start: 2022-06-09 | End: 2022-06-09

## 2022-06-08 RX ORDER — SODIUM CHLORIDE 9 MG/ML
1000 INJECTION, SOLUTION INTRAVENOUS
Refills: 0 | Status: DISCONTINUED | OUTPATIENT
Start: 2022-06-08 | End: 2022-06-09

## 2022-06-08 RX ORDER — HYOSCYAMINE SULFATE 0.13 MG
0.12 TABLET ORAL EVERY 4 HOURS
Refills: 0 | Status: DISCONTINUED | OUTPATIENT
Start: 2022-06-08 | End: 2022-06-09

## 2022-06-08 RX ADMIN — Medication 1000 MILLIGRAM(S): at 17:55

## 2022-06-08 RX ADMIN — Medication 10 MILLIGRAM(S): at 13:29

## 2022-06-08 RX ADMIN — PANTOPRAZOLE SODIUM 40 MILLIGRAM(S): 20 TABLET, DELAYED RELEASE ORAL at 11:31

## 2022-06-08 RX ADMIN — Medication 400 MILLIGRAM(S): at 17:40

## 2022-06-08 RX ADMIN — Medication 15 MILLIGRAM(S): at 17:55

## 2022-06-08 RX ADMIN — FENTANYL CITRATE 50 MICROGRAM(S): 50 INJECTION INTRAVENOUS at 12:20

## 2022-06-08 RX ADMIN — HEPARIN SODIUM 5000 UNIT(S): 5000 INJECTION INTRAVENOUS; SUBCUTANEOUS at 23:06

## 2022-06-08 RX ADMIN — ONDANSETRON 4 MILLIGRAM(S): 8 TABLET, FILM COATED ORAL at 17:39

## 2022-06-08 RX ADMIN — Medication 0.5 MILLIGRAM(S): at 15:45

## 2022-06-08 RX ADMIN — Medication 100 MILLIGRAM(S): at 23:04

## 2022-06-08 RX ADMIN — Medication 15 MILLIGRAM(S): at 17:40

## 2022-06-08 RX ADMIN — HEPARIN SODIUM 5000 UNIT(S): 5000 INJECTION INTRAVENOUS; SUBCUTANEOUS at 15:43

## 2022-06-08 RX ADMIN — HEPARIN SODIUM 5000 UNIT(S): 5000 INJECTION INTRAVENOUS; SUBCUTANEOUS at 06:10

## 2022-06-08 RX ADMIN — ONDANSETRON 4 MILLIGRAM(S): 8 TABLET, FILM COATED ORAL at 15:38

## 2022-06-08 NOTE — BRIEF OPERATIVE NOTE - NSICDXBRIEFPROCEDURE_GEN_ALL_CORE_FT
PROCEDURES:  Robot-assisted Denice-en-Y gastric bypass using da Arslan Xi 08-Jun-2022 10:41:51  Prosper Schafer 08-Jun-2022 10:42:51  Prosper Schafer

## 2022-06-08 NOTE — BRIEF OPERATIVE NOTE - OPERATION/FINDINGS
robotic conversion sleeve gastrectomy to fe en y gastric bypass  GJ anastomosis with 60 blue load stapler   BP limb creation 100cc distal ligament treitz anastamosis with 60 blue load stapler  upper EGD with patent GJ  closure right lateral 12mm port fascia with 0 vicryl  port site incisions closed with 4-0 monocryl; dermabond

## 2022-06-09 ENCOUNTER — TRANSCRIPTION ENCOUNTER (OUTPATIENT)
Age: 62
End: 2022-06-09

## 2022-06-09 VITALS
HEART RATE: 70 BPM | DIASTOLIC BLOOD PRESSURE: 75 MMHG | SYSTOLIC BLOOD PRESSURE: 128 MMHG | TEMPERATURE: 98 F | OXYGEN SATURATION: 92 %

## 2022-06-09 LAB
BASOPHILS # BLD AUTO: 0.02 K/UL — SIGNIFICANT CHANGE UP (ref 0–0.2)
BASOPHILS NFR BLD AUTO: 0.2 % — SIGNIFICANT CHANGE UP (ref 0–2)
EOSINOPHIL # BLD AUTO: 0 K/UL — SIGNIFICANT CHANGE UP (ref 0–0.5)
EOSINOPHIL NFR BLD AUTO: 0 % — SIGNIFICANT CHANGE UP (ref 0–6)
GLUCOSE BLDC GLUCOMTR-MCNC: 125 MG/DL — HIGH (ref 70–99)
HCT VFR BLD CALC: 39.2 % — SIGNIFICANT CHANGE UP (ref 34.5–45)
HGB BLD-MCNC: 12.9 G/DL — SIGNIFICANT CHANGE UP (ref 11.5–15.5)
IMM GRANULOCYTES NFR BLD AUTO: 0.4 % — SIGNIFICANT CHANGE UP (ref 0–1.5)
LYMPHOCYTES # BLD AUTO: 1.52 K/UL — SIGNIFICANT CHANGE UP (ref 1–3.3)
LYMPHOCYTES # BLD AUTO: 13.1 % — SIGNIFICANT CHANGE UP (ref 13–44)
MCHC RBC-ENTMCNC: 30.9 PG — SIGNIFICANT CHANGE UP (ref 27–34)
MCHC RBC-ENTMCNC: 32.9 GM/DL — SIGNIFICANT CHANGE UP (ref 32–36)
MCV RBC AUTO: 93.8 FL — SIGNIFICANT CHANGE UP (ref 80–100)
MONOCYTES # BLD AUTO: 1.23 K/UL — HIGH (ref 0–0.9)
MONOCYTES NFR BLD AUTO: 10.6 % — SIGNIFICANT CHANGE UP (ref 2–14)
NEUTROPHILS # BLD AUTO: 8.82 K/UL — HIGH (ref 1.8–7.4)
NEUTROPHILS NFR BLD AUTO: 75.7 % — SIGNIFICANT CHANGE UP (ref 43–77)
PLATELET # BLD AUTO: 159 K/UL — SIGNIFICANT CHANGE UP (ref 150–400)
RBC # BLD: 4.18 M/UL — SIGNIFICANT CHANGE UP (ref 3.8–5.2)
RBC # FLD: 12.9 % — SIGNIFICANT CHANGE UP (ref 10.3–14.5)
WBC # BLD: 11.64 K/UL — HIGH (ref 3.8–10.5)
WBC # FLD AUTO: 11.64 K/UL — HIGH (ref 3.8–10.5)

## 2022-06-09 PROCEDURE — S2900: CPT

## 2022-06-09 PROCEDURE — 36415 COLL VENOUS BLD VENIPUNCTURE: CPT

## 2022-06-09 PROCEDURE — 82962 GLUCOSE BLOOD TEST: CPT

## 2022-06-09 PROCEDURE — 85025 COMPLETE CBC W/AUTO DIFF WBC: CPT

## 2022-06-09 PROCEDURE — 80048 BASIC METABOLIC PNL TOTAL CA: CPT

## 2022-06-09 PROCEDURE — C1889: CPT

## 2022-06-09 RX ORDER — CELECOXIB 200 MG/1
1 CAPSULE ORAL
Qty: 10 | Refills: 0
Start: 2022-06-09 | End: 2022-06-18

## 2022-06-09 RX ORDER — OMEPRAZOLE 10 MG/1
1 CAPSULE, DELAYED RELEASE ORAL
Qty: 30 | Refills: 0
Start: 2022-06-09 | End: 2022-07-08

## 2022-06-09 RX ORDER — ACETAMINOPHEN 500 MG
30 TABLET ORAL
Qty: 150 | Refills: 0
Start: 2022-06-09 | End: 2022-06-13

## 2022-06-09 RX ORDER — ONDANSETRON 8 MG/1
1 TABLET, FILM COATED ORAL
Qty: 56 | Refills: 0
Start: 2022-06-09 | End: 2022-06-22

## 2022-06-09 RX ORDER — HYOSCYAMINE SULFATE 0.13 MG
1 TABLET ORAL
Qty: 56 | Refills: 0
Start: 2022-06-09 | End: 2022-06-22

## 2022-06-09 RX ADMIN — Medication 100 MICROGRAM(S): at 05:25

## 2022-06-09 RX ADMIN — ONDANSETRON 4 MILLIGRAM(S): 8 TABLET, FILM COATED ORAL at 00:53

## 2022-06-09 RX ADMIN — Medication 100 MILLIGRAM(S): at 05:31

## 2022-06-09 RX ADMIN — HEPARIN SODIUM 5000 UNIT(S): 5000 INJECTION INTRAVENOUS; SUBCUTANEOUS at 05:30

## 2022-06-09 RX ADMIN — Medication 15 MILLIGRAM(S): at 06:32

## 2022-06-09 RX ADMIN — Medication 400 MILLIGRAM(S): at 00:51

## 2022-06-09 RX ADMIN — Medication 15 MILLIGRAM(S): at 00:53

## 2022-06-09 RX ADMIN — Medication 975 MILLIGRAM(S): at 06:32

## 2022-06-09 RX ADMIN — Medication 975 MILLIGRAM(S): at 05:16

## 2022-06-09 RX ADMIN — Medication 15 MILLIGRAM(S): at 01:37

## 2022-06-09 RX ADMIN — SODIUM CHLORIDE 125 MILLILITER(S): 9 INJECTION, SOLUTION INTRAVENOUS at 05:15

## 2022-06-09 RX ADMIN — Medication 1000 MILLIGRAM(S): at 01:37

## 2022-06-09 RX ADMIN — ONDANSETRON 4 MILLIGRAM(S): 8 TABLET, FILM COATED ORAL at 05:29

## 2022-06-09 RX ADMIN — Medication 15 MILLIGRAM(S): at 05:26

## 2022-06-09 NOTE — PHARMACOTHERAPY INTERVENTION NOTE - COMMENTS
Spoke to patient about absorption issues with medications and the need to crush tablets or open capsules for the next 30 days. Reinforced discussion points from previous counseling. Informed patient of new prescriptions sent to pharmacy.

## 2022-06-09 NOTE — PROGRESS NOTE ADULT - SUBJECTIVE AND OBJECTIVE BOX
Post Op Check     Subjective: Patient seen and examined post op. Had some nausea earlier, but relieved with Zofran and Reglan. Also reports dizziness while ambulating. Pain is well controlled. Ambulating and voiding. Denies vomiting, CP, SOB, HA.     STATUS POST: Robotic gastric bypass     POST OPERATIVE DAY #: 0    MEDICATIONS  (STANDING):  acetaminophen   IVPB .. 1000 milliGRAM(s) IV Intermittent once  acetaminophen   IVPB .. 1000 milliGRAM(s) IV Intermittent once  ceFAZolin   IVPB 2000 milliGRAM(s) IV Intermittent once  ceFAZolin   IVPB 2000 milliGRAM(s) IV Intermittent every 8 hours  heparin   Injectable 5000 Unit(s) SubCutaneous every 8 hours  ketorolac   Injectable 15 milliGRAM(s) IV Push every 6 hours  lactated ringers. 1000 milliLiter(s) (75 mL/Hr) IV Continuous <Continuous>  lactated ringers. 1000 milliLiter(s) (125 mL/Hr) IV Continuous <Continuous>  levothyroxine 100 MICROGram(s) Oral daily  ondansetron Injectable 4 milliGRAM(s) IV Push every 6 hours  pantoprazole  Injectable 40 milliGRAM(s) IV Push every 24 hours  sodium chloride 0.9% 1000 milliLiter(s) (250 mL/Hr) IV Continuous <Continuous>    MEDICATIONS  (PRN):  fentaNYL    Injectable 25 MICROGram(s) IV Push every 5 minutes PRN Moderate Pain (4 - 6)  fentaNYL    Injectable 50 MICROGram(s) IV Push every 5 minutes PRN Severe Pain (7 - 10)  hyoscyamine SL 0.125 milliGRAM(s) SubLingual every 4 hours PRN gastric spasm  ketorolac   Injectable 15 milliGRAM(s) IV Push every 6 hours PRN break through pain  LORazepam   Injectable 0.5 milliGRAM(s) IV Push once PRN Esophageal Spasm    Vital Signs Last 24 Hrs  T(C): 36.6 (08 Jun 2022 10:57), Max: 36.6 (08 Jun 2022 05:59)  T(F): 97.8 (08 Jun 2022 10:57), Max: 97.9 (08 Jun 2022 05:59)  HR: 78 (08 Jun 2022 11:45) (78 - 84)  BP: 161/79 (08 Jun 2022 11:45) (138/78 - 161/79)  BP(mean): 99 (08 Jun 2022 11:45) (95 - 100)  RR: 20 (08 Jun 2022 11:45) (15 - 20)  SpO2: 100% (08 Jun 2022 11:45) (97% - 100%)    Physical Exam:    Constitutional: NAD  HEENT: PERRL, EOMI  Neck: No JVD, FROM without pain  Respiratory: Respirations non-labored, no accessory muscle use  Gastrointestinal: Soft, non-tender, non-distended, 5 incisions c/d/i   Extremities: No peripheral edema, No cyanosis  Neurological: A&O x 3; without gross deficit  Musculoskeletal: No joint pain, swelling, deformity, or point tenderness; no limitation of movement    LAB                  14.1   11.32 )-----------( 192      ( 08 Jun 2022 11:23 )             41.7   06-08  136  |  101  |  8.6  ----------------------------<  134<H>  4.6   |  19.0<L>  |  0.63    Ca    9.0      08 Jun 2022 11:23    A: Patient is a 61 yo F s/p robotic gastric bypass, POD#0.     Plan:   - Post op labs reviewed   - May start bariatric clears   - Pain control with Tylenol and Toradol   - Anti spasmodics   - Anti emetics prn   - Heparin and scd for dvt ppx   - Ambulate OOB   - AM labs   = DC prepping   
INTERVAL HPI/OVERNIGHT EVENTS:    Patient evaluated at bedside. No acute distress. No acute events overnight.  Tolerating CLD  Voiding  Ambulated  Pain well controlled    Denies SOB and chest pain    MEDICATIONS  (STANDING):  acetaminophen    Suspension .. 975 milliGRAM(s) Oral every 8 hours  ceFAZolin   IVPB 2000 milliGRAM(s) IV Intermittent once  ceFAZolin   IVPB 2000 milliGRAM(s) IV Intermittent every 8 hours  heparin   Injectable 5000 Unit(s) SubCutaneous every 8 hours  ketorolac   Injectable 15 milliGRAM(s) IV Push every 6 hours  lactated ringers. 1000 milliLiter(s) (125 mL/Hr) IV Continuous <Continuous>  levothyroxine 100 MICROGram(s) Oral daily  ondansetron Injectable 4 milliGRAM(s) IV Push every 6 hours  pantoprazole  Injectable 40 milliGRAM(s) IV Push every 24 hours  sodium chloride 0.9% 1000 milliLiter(s) (250 mL/Hr) IV Continuous <Continuous>    MEDICATIONS  (PRN):  hyoscyamine SL 0.125 milliGRAM(s) SubLingual every 4 hours PRN gastric spasm  ketorolac   Injectable 15 milliGRAM(s) IV Push every 6 hours PRN break through pain      Vital Signs Last 24 Hrs  T(C): 36.6 (08 Jun 2022 21:14), Max: 36.9 (08 Jun 2022 14:00)  T(F): 97.8 (08 Jun 2022 21:14), Max: 98.4 (08 Jun 2022 14:00)  HR: 76 (08 Jun 2022 21:14) (71 - 84)  BP: 130/76 (08 Jun 2022 21:14) (128/72 - 161/79)  BP(mean): 99 (08 Jun 2022 11:45) (95 - 100)  RR: 18 (08 Jun 2022 21:14) (15 - 22)  SpO2: 95% (08 Jun 2022 21:14) (94% - 100%)    Constitutional: NAD  HEENT: PERRL, EOMI  Neck: No JVD, FROM without pain  Respiratory: Respirations non-labored, no accessory muscle use  Gastrointestinal: Soft, non-tender, non-distended, 5 incisions c/d/i   Extremities: No peripheral edema, No cyanosis  Neurological: A&O x 3; without gross deficit  Musculoskeletal: No joint pain, swelling, deformity, or point tenderness; no limitation of movement    I&O's Detail    08 Jun 2022 07:01  -  09 Jun 2022 02:15  --------------------------------------------------------  IN:    Lactated Ringers: 125 mL    Oral Fluid: 120 mL    sodium chloride 0.9% w/ Additives: 1500 mL  Total IN: 1745 mL    OUT:    Voided (mL): 650 mL  Total OUT: 650 mL    Total NET: 1095 mL          LABS:                        14.1   11.32 )-----------( 192      ( 08 Jun 2022 11:23 )             41.7     06-08    136  |  101  |  8.6  ----------------------------<  134<H>  4.6   |  19.0<L>  |  0.63    Ca    9.0      08 Jun 2022 11:23            RADIOLOGY & ADDITIONAL STUDIES:

## 2022-06-09 NOTE — DISCHARGE NOTE PROVIDER - HOSPITAL COURSE
On , 6/9/2022 Ms Harrison, who has a history of hiatal hernia, acoustic neuroma, GERD, TARA, diverticulitis, hypothyroid, and morbid obesity BMI ..., underwent robotic gastric sleeve conversion to Denice-en-Y gastric bypass and EGD. Bariatric ERAS protocol followed to include preoperative and perioperative use of DVT and SSI prophylaxis as well as multi-modal non-opioid analgesia. Patient tolerated the procedure well  extubated in the operating room then transferred to the PACU in stable condition. Once hemodynamically stable and effective pain control the patient was able to ambulate with assistance. Pt was also able to void within 4 hours following the procedure. The patient was later transferred to a bariatric unit and placed on bedside continuous pulse oximetry. Pain management included IV multi-modal non-opioid analgesia then transitioned to liquid as needed. The patient tolerated bariatric clear liquid the evening of surgery.    On POD #1 patient remained stable with no acute events overnight, has effective  pain control. Denies nausea and vomiting. Patient is ambulating independently and voiding as expected. The rest of the hospital course was uneventful, patient met 8-Point Bariatric Surgery D/C criteria and subsequently cleared for discharge home on POD#1. ...... extended VTE prophylaxis ........ ([facility] VTE Risk Stratification Risk  (% ). The patient will follow a protocol-derived staged meal progression supervised by the dietitian in the outpatient setting. Patient will follow-up with  ........in 7-10 days, medical doctor and dietitian in 30 days. All appropriate [facility] from [facility] prior to discharge. Written discharge instruction explained and given to include VTE prevention.   On , 6/9/2022 Ms Harrison, who has a history of hiatal hernia, acoustic neuroma, GERD, TARA, diverticulitis, hypothyroid, and morbid obesity BMI ..., underwent robotic gastric sleeve conversion to Denice-en-Y gastric bypass and EGD. Bariatric ERAS protocol followed to include preoperative and perioperative use of DVT and SSI prophylaxis as well as multi-modal non-opioid analgesia. Patient tolerated the procedure well  extubated in the operating room then transferred to the PACU in stable condition. Once hemodynamically stable and effective pain control the patient was able to ambulate with assistance. Pt was also able to void within 4 hours following the procedure. The patient was later transferred to a bariatric unit and placed on bedside continuous pulse oximetry. Pain management included IV multi-modal non-opioid analgesia then transitioned to liquid as needed. The patient tolerated bariatric clear liquid the evening of surgery.    On POD #1 patient remained stable with no acute events overnight, has effective  pain control. Denies nausea and vomiting. Patient is ambulating independently and voiding as expected. The rest of the hospital course was uneventful, patient met 8-Point Bariatric Surgery D/C criteria and subsequently cleared for discharge home on POD#1. On the bariatric surgery risk stratification, patient scored a 12, therefore prolong VTE ppx is not indicated. The patient will follow a protocol-derived staged meal progression supervised by the dietitian in the outpatient setting. Patient will follow-up with Dr. Glass in 7-10 days, medical doctor and dietitian in 30 days. All appropriate prescriptions obtained from [facility] prior to discharge. Written discharge instruction explained and given to include VTE prevention.

## 2022-06-09 NOTE — PROGRESS NOTE ADULT - ASSESSMENT
Patient is a 61 yo F s/p robotic gastric bypass, POD#1.   Doing well no acute issues  Progressing well post op  Patient for possible discharge today

## 2022-06-09 NOTE — DISCHARGE NOTE NURSING/CASE MANAGEMENT/SOCIAL WORK - NSDCPEFALRISK_GEN_ALL_CORE
For information on Fall & Injury Prevention, visit: [email or web address] OR  [email or web address] OR  https://www.cdc.gov/steadi/patient.html

## 2022-06-09 NOTE — DISCHARGE NOTE PROVIDER - NSDCFUSCHEDAPPT_GEN_ALL_CORE_FT
Morgan Glass [facility]  [address]  Scheduled Appointment: 06/20/2022    Mrogan Glass [facility]  [address]  Scheduled Appointment: 07/11/2022

## 2022-06-09 NOTE — DISCHARGE NOTE NURSING/CASE MANAGEMENT/SOCIAL WORK - PATIENT PORTAL LINK FT
You can access the FollowMyHealth Patient Portal offered by [facility] by registering at the following website: [email or web address]. By joining "[employer]"’s FollowMyHealth portal, you will also be able to view your health information using other applications (apps) compatible with our system.

## 2022-06-09 NOTE — CHART NOTE - NSCHARTNOTEFT_GEN_A_CORE
Bariatric Nutrition Note:    Diet: Diet, Clear Liquid:   Bariatric Clear Liquid (BARICLLIQ)     Special Instructions for Nursing:  Bariatric Clear Liquid,  start 6 hours postop if no nausea vomiting (06-08-22 @ 11:20)    PO intake/tolerance: Pt reports tolerating clears well. Denies N/V/D.     Education: Provided pt with verbal/written literature on bariatric clear liquid diet (POD 1-2) and bariatric full liquid diet (POD 3-14). Pt demonstrates good understanding. Pt to follow up with outpatient RD after discharge.

## 2022-06-09 NOTE — DISCHARGE NOTE PROVIDER - NSDCMRMEDTOKEN_GEN_ALL_CORE_FT
levothyroxine 100 mcg (0.1 mg) oral tablet: 1 tab(s) orally once a day  omeprazole 40 mg oral delayed release capsule: 1 cap(s) orally once a day   open capsule into diet      acetaminophen 160 mg/5 mL oral liquid: 30 milliliter(s) orally once a day   CeleBREX 200 mg oral capsule: 1 cap(s) orally once a day, please crush prior to taking   levothyroxine 100 mcg (0.1 mg) oral tablet: 1 tab(s) orally once a day  Levsin 0.125 mg oral tablet: 1 tab(s) orally every 6 hours, please crush to take. Take as needed.   omeprazole 40 mg oral delayed release capsule: 1 cap(s) orally once a day   open capsule into diet     omeprazole 40 mg oral delayed release capsule: 1 cap(s) orally once a day, please crush to take   ondansetron 4 mg oral tablet: 1 tab(s) orally every 6 hours, as needed for nausea, please crush tab to take    acetaminophen 160 mg/5 mL oral liquid: 30 milliliter(s) orally once a day   CeleBREX 200 mg oral capsule: 1 cap(s) orally once a day, please crush prior to taking   levothyroxine 100 mcg (0.1 mg) oral tablet: 1 tab(s) orally once a day  Levsin 0.125 mg oral tablet: 1 tab(s) orally every 6 hours, please crush to take. Take as needed.   omeprazole 40 mg oral delayed release capsule: 1 cap(s) orally once a day   open capsule into diet     ondansetron 4 mg oral tablet: 1 tab(s) orally every 6 hours, as needed for nausea, please crush tab to take

## 2022-06-09 NOTE — DISCHARGE NOTE PROVIDER - NSDCCPCAREPLAN_GEN_ALL_CORE_FT
PRINCIPAL DISCHARGE DIAGNOSIS  Diagnosis: Severe obesity  Assessment and Plan of Treatment: BATHING: Please do not submerge wound underwater. You may shower and/or sponge bathe.   ACTIVITY: No heavy lifting or straining. Otherwise, you may return to your usual level of physical activity.   NOTIFY YOUR SURGEON IF: You have any bleeding that does not stop, any pus draining from your wound(s), any fever (over 100.4 F) or chills, persistent nausea/vomiting, persistent diarrhea, or if your pain is not controlled on your discharge pain medications.  FOLLOW-UP: Please follow up with your primary care physician and Acute Care Surgery clinic [phone] in 10-14 days regarding your hospitalization. Call for appointment upon discharge.

## 2022-06-09 NOTE — DISCHARGE NOTE PROVIDER - CARE PROVIDER_API CALL
Morgan Glass)  Surgery  [address] [facility] [address] [place] [address]  Phone: [phone]  Fax: [phone]  Follow Up Time: 2 weeks

## 2022-06-10 ENCOUNTER — NON-APPOINTMENT (OUTPATIENT)
Age: 62
End: 2022-06-10

## 2022-06-15 ENCOUNTER — NON-APPOINTMENT (OUTPATIENT)
Age: 62
End: 2022-06-15

## 2022-06-15 DIAGNOSIS — Z09 ENCOUNTER FOR FOLLOW-UP EXAMINATION AFTER COMPLETED TREATMENT FOR CONDITIONS OTHER THAN MALIGNANT NEOPLASM: ICD-10-CM

## 2022-06-15 DIAGNOSIS — R21 RASH AND OTHER NONSPECIFIC SKIN ERUPTION: ICD-10-CM

## 2022-06-15 RX ORDER — TRIAMCINOLONE ACETONIDE 1 MG/G
0.1 CREAM TOPICAL TWICE DAILY
Qty: 1 | Refills: 1 | Status: ACTIVE | COMMUNITY
Start: 2022-06-15 | End: 1900-01-01

## 2022-06-20 ENCOUNTER — APPOINTMENT (OUTPATIENT)
Dept: SURGERY | Facility: CLINIC | Age: 62
End: 2022-06-20
Payer: COMMERCIAL

## 2022-06-20 VITALS
HEART RATE: 64 BPM | DIASTOLIC BLOOD PRESSURE: 82 MMHG | BODY MASS INDEX: 40.78 KG/M2 | SYSTOLIC BLOOD PRESSURE: 127 MMHG | HEIGHT: 61 IN | WEIGHT: 216 LBS | OXYGEN SATURATION: 97 % | RESPIRATION RATE: 14 BRPM | TEMPERATURE: 97 F

## 2022-06-20 PROCEDURE — 99024 POSTOP FOLLOW-UP VISIT: CPT

## 2022-06-20 NOTE — ASSESSMENT
[___ Days Post Op] : [unfilled] days [Today's BMI: ___] : Today's BMI: [unfilled] [de-identified] : Doing very well postoperatively.  She will follow-up with the bariatric dietitian to review the next stage in her diet plan.  I discussed with the patient that we will plan to perform hernia repair after her BMI reaches 35, as this lowers her chance of postoperative complications and recurrence.

## 2022-06-20 NOTE — HISTORY OF PRESENT ILLNESS
[Procedure: ___] : Procedure performed: [unfilled]  [Date of Surgery: ___] : Date of Surgery:   [unfilled] [Surgeon Name:   ___] : Surgeon Name: Dr. BASS [___ Days Post Op] : [unfilled] days  [de-identified] : Presents for routine postoperative follow-up.  Reports no pain.  Denies dysphagia or nausea/vomiting.  Tolerating bariatric full liquids.  Having normal bowel function and flatus.  No fever/chills. [Phase 1] : Phase 1 [Walking] : walking

## 2022-06-20 NOTE — REASON FOR VISIT
[Gastric By-pass] : gastric by-pass [de-identified] : 6/8/22 [de-identified] : Robotic revision of sleeve to gastric bypass

## 2022-06-22 ENCOUNTER — NON-APPOINTMENT (OUTPATIENT)
Age: 62
End: 2022-06-22

## 2022-07-11 ENCOUNTER — APPOINTMENT (OUTPATIENT)
Dept: SURGERY | Facility: CLINIC | Age: 62
End: 2022-07-11

## 2022-07-11 VITALS
TEMPERATURE: 97.3 F | BODY MASS INDEX: 40.45 KG/M2 | RESPIRATION RATE: 14 BRPM | SYSTOLIC BLOOD PRESSURE: 128 MMHG | HEART RATE: 57 BPM | HEIGHT: 61 IN | DIASTOLIC BLOOD PRESSURE: 83 MMHG | OXYGEN SATURATION: 57 % | WEIGHT: 214.25 LBS

## 2022-07-11 PROCEDURE — 99024 POSTOP FOLLOW-UP VISIT: CPT

## 2022-07-11 NOTE — HISTORY OF PRESENT ILLNESS
[Procedure: ___] : Procedure performed: [unfilled]  [Date of Surgery: ___] : Date of Surgery:   [unfilled] [Surgeon Name:   ___] : Surgeon Name: Dr. BASS [___ Days Post Op] : [unfilled] days  [de-identified] : Presents for routine postoperative follow-up.  Reports no pain.  Denies dysphagia or nausea/vomiting.  Tolerating bariatric full liquids.  Having normal bowel function and flatus.  No fever/chills.\par \par 7/11 update:\par Overall doing well.  Occasionally feels food getting stuck in her esophagus.  Weight loss has stalled which is frustrating for her.  Tolerating soft diet and protein shakes.  [Phase 2] : Phase 2

## 2022-07-11 NOTE — ASSESSMENT
[de-identified] : Overall doing well status post conversion of sleeve gastrectomy to gastric bypass.  She is the same weight as her last postoperative visit last month.  After discussion with the bariatric dietitian, we believe she has not maintaining adequate caloric intake to lose weight.  She has also been instructed to increase her physical activity.  I have also ordered an upper GI to assess her dysphagia symptoms

## 2022-07-18 ENCOUNTER — OUTPATIENT (OUTPATIENT)
Dept: OUTPATIENT SERVICES | Facility: HOSPITAL | Age: 62
LOS: 1 days | End: 2022-07-18
Payer: COMMERCIAL

## 2022-07-18 DIAGNOSIS — Z98.890 OTHER SPECIFIED POSTPROCEDURAL STATES: Chronic | ICD-10-CM

## 2022-07-18 DIAGNOSIS — Z90.710 ACQUIRED ABSENCE OF BOTH CERVIX AND UTERUS: Chronic | ICD-10-CM

## 2022-07-18 DIAGNOSIS — Z98.89 OTHER SPECIFIED POSTPROCEDURAL STATES: Chronic | ICD-10-CM

## 2022-07-18 DIAGNOSIS — Z90.3 ACQUIRED ABSENCE OF STOMACH [PART OF]: ICD-10-CM

## 2022-07-18 PROCEDURE — 74246 X-RAY XM UPR GI TRC 2CNTRST: CPT | Mod: 26

## 2022-07-18 PROCEDURE — 74246 X-RAY XM UPR GI TRC 2CNTRST: CPT

## 2022-07-21 ENCOUNTER — TRANSCRIPTION ENCOUNTER (OUTPATIENT)
Age: 62
End: 2022-07-21

## 2022-07-25 ENCOUNTER — APPOINTMENT (OUTPATIENT)
Dept: SURGERY | Facility: CLINIC | Age: 62
End: 2022-07-25

## 2022-07-25 VITALS
HEART RATE: 67 BPM | TEMPERATURE: 97.2 F | SYSTOLIC BLOOD PRESSURE: 132 MMHG | HEIGHT: 61 IN | DIASTOLIC BLOOD PRESSURE: 75 MMHG | RESPIRATION RATE: 14 BRPM | BODY MASS INDEX: 39.72 KG/M2 | OXYGEN SATURATION: 96 % | WEIGHT: 210.38 LBS

## 2022-07-25 PROCEDURE — 99024 POSTOP FOLLOW-UP VISIT: CPT

## 2022-07-25 NOTE — ASSESSMENT
[de-identified] : Overall, the patient is doing very well but is frustrated by her slow rate of weight loss.  However, her current BMI is 39, down from her initial BMI of 45 when she first came to us for evaluation.  I told her that our goal to perform hernia repair is a BMI of 35 or lower, which she is on pace to achieve.  She will continue to work with the bariatric dietitian to maximize her weight loss and I encouraged her to increase her exercise capacity.

## 2022-07-25 NOTE — HISTORY OF PRESENT ILLNESS
[Procedure: ___] : Procedure performed: [unfilled]  [Date of Surgery: ___] : Date of Surgery:   [unfilled] [Surgeon Name:   ___] : Surgeon Name: Dr. BASS [___ Days Post Op] : [unfilled] days  [de-identified] : Presents for routine postoperative follow-up.  Reports no pain.  Denies dysphagia or nausea/vomiting.  Tolerating bariatric full liquids.  Having normal bowel function and flatus.  No fever/chills.  She is frustrated by her perceived slow rate of weight loss.  Recent upper GI showed free contrast passage through the gastric pouch and into the Denice limb.\par \par 7/11 update:\par Overall doing well.  Occasionally feels food getting stuck in her esophagus.  Weight loss has stalled which is frustrating for her.  Tolerating soft diet and protein shakes.

## 2022-08-15 ENCOUNTER — APPOINTMENT (OUTPATIENT)
Dept: SURGERY | Facility: CLINIC | Age: 62
End: 2022-08-15

## 2022-08-15 VITALS
OXYGEN SATURATION: 97 % | HEART RATE: 69 BPM | SYSTOLIC BLOOD PRESSURE: 114 MMHG | HEIGHT: 61 IN | BODY MASS INDEX: 39.27 KG/M2 | DIASTOLIC BLOOD PRESSURE: 74 MMHG | TEMPERATURE: 97.6 F | WEIGHT: 208 LBS

## 2022-08-15 PROCEDURE — 99213 OFFICE O/P EST LOW 20 MIN: CPT | Mod: 24

## 2022-08-15 NOTE — ASSESSMENT
[FreeTextEntry1] : In brief, this patient is a pleasant 62-year-old female who is status post sleeve to bypass bariatric revision, performed in order to lose weight preoperatively for recurrent incisional hernia repair.  Her current BMI is 39 and I believe her weight is sufficiently low to warrant proceeding with repair given her current discomfort and tenderness.  I believe a robotic retrorectus repair is ideal, likely as an eTEP.

## 2022-08-15 NOTE — REVIEW OF SYSTEMS
[Fever] : no fever [Eye Pain] : no eye pain [Dysphagia] : no dysphagia [Loss of Hearing] : no loss of hearing [Odynophagia] : no odynophagia [Chest Pain] : no chest pain [Palpitations] : no palpitations [Shortness Of Breath] : no shortness of breath [Wheezing] : no wheezing [Abdominal Pain] : abdominal pain [Vomiting] : no vomiting [Reflux/Heartburn] : no reflux/ heartburn [Hernia] : hernia [Dysuria] : no dysuria [Incontinence] : no incontinence [Joint Pain] : no joint pain [Joint Stiffness] : no joint stiffness [Skin Rash] : no skin rash [Skin Wound] : no skin wound [Confused] : no confusion [Dizziness] : no dizziness [Suicidal] : not suicidal [Insomnia] : no insomnia [Anxiety] : no anxiety [Proptosis] : no proptosis [Hot Flashes] : no hot flashes

## 2022-08-15 NOTE — HISTORY OF PRESENT ILLNESS
[de-identified] : 61F presents today to discuss her weight loss options. History of TARA and hypothyroidism, as well as previous sleeve gastrectomy for which she lost ~40 lbs but then subsequently regained the weight back. She has had multiple incisional hernia repairs by Dr. Finkelstein complicated by recurrence. LAst seen by Dr. Ortiz, who recommended pre-repair weight loss in order to optimize her outcomes. \par She states that she never lost significant weight after her previous sleeve, and feels no restriction when she eats. \par Her hernia causes significant pain and she would like to have it repaired as soon as possible. \par No recent illnesses. No GERD. [de-identified] : The patient returns for ongoing postoperative bariatric follow-up.  Her current BMI is down to 39 and she has had some sluggish weight loss, which is frustrating to her.  Nonetheless, she is continuing to slowly lose weight and is working with the bariatric dietitian and medical bariatrician.  She states that her hernia is causing significant discomfort and is hindering her ability to perform physical exercise.  She denies bowel obstructions or recent episodes of incarceration.

## 2022-08-15 NOTE — PHYSICAL EXAM
[Normal] : affect appropriate [de-identified] : Reducible incisional hernia with no overlying erythema.

## 2022-08-23 ENCOUNTER — OUTPATIENT (OUTPATIENT)
Dept: OUTPATIENT SERVICES | Facility: HOSPITAL | Age: 62
LOS: 1 days | End: 2022-08-23
Payer: COMMERCIAL

## 2022-08-23 VITALS
RESPIRATION RATE: 20 BRPM | HEIGHT: 61 IN | DIASTOLIC BLOOD PRESSURE: 70 MMHG | TEMPERATURE: 97 F | HEART RATE: 64 BPM | OXYGEN SATURATION: 98 % | WEIGHT: 207.23 LBS | SYSTOLIC BLOOD PRESSURE: 120 MMHG

## 2022-08-23 DIAGNOSIS — Z98.890 OTHER SPECIFIED POSTPROCEDURAL STATES: Chronic | ICD-10-CM

## 2022-08-23 DIAGNOSIS — Z98.89 OTHER SPECIFIED POSTPROCEDURAL STATES: Chronic | ICD-10-CM

## 2022-08-23 DIAGNOSIS — Z01.818 ENCOUNTER FOR OTHER PREPROCEDURAL EXAMINATION: ICD-10-CM

## 2022-08-23 DIAGNOSIS — Z29.9 ENCOUNTER FOR PROPHYLACTIC MEASURES, UNSPECIFIED: ICD-10-CM

## 2022-08-23 DIAGNOSIS — K43.2 INCISIONAL HERNIA WITHOUT OBSTRUCTION OR GANGRENE: ICD-10-CM

## 2022-08-23 DIAGNOSIS — Z90.710 ACQUIRED ABSENCE OF BOTH CERVIX AND UTERUS: Chronic | ICD-10-CM

## 2022-08-23 LAB
A1C WITH ESTIMATED AVERAGE GLUCOSE RESULT: 5.4 % — SIGNIFICANT CHANGE UP (ref 4–5.6)
ALBUMIN SERPL ELPH-MCNC: 4 G/DL — SIGNIFICANT CHANGE UP (ref 3.3–5.2)
ALP SERPL-CCNC: 104 U/L — SIGNIFICANT CHANGE UP (ref 40–120)
ALT FLD-CCNC: 19 U/L — SIGNIFICANT CHANGE UP
ANION GAP SERPL CALC-SCNC: 12 MMOL/L — SIGNIFICANT CHANGE UP (ref 5–17)
APTT BLD: 31.3 SEC — SIGNIFICANT CHANGE UP (ref 27.5–35.5)
AST SERPL-CCNC: 23 U/L — SIGNIFICANT CHANGE UP
BASOPHILS # BLD AUTO: 0.08 K/UL — SIGNIFICANT CHANGE UP (ref 0–0.2)
BASOPHILS NFR BLD AUTO: 1 % — SIGNIFICANT CHANGE UP (ref 0–2)
BILIRUB SERPL-MCNC: 0.3 MG/DL — LOW (ref 0.4–2)
BLD GP AB SCN SERPL QL: SIGNIFICANT CHANGE UP
BUN SERPL-MCNC: 8.6 MG/DL — SIGNIFICANT CHANGE UP (ref 8–20)
CALCIUM SERPL-MCNC: 9.3 MG/DL — SIGNIFICANT CHANGE UP (ref 8.4–10.5)
CHLORIDE SERPL-SCNC: 107 MMOL/L — SIGNIFICANT CHANGE UP (ref 98–107)
CO2 SERPL-SCNC: 24 MMOL/L — SIGNIFICANT CHANGE UP (ref 22–29)
CREAT SERPL-MCNC: 0.61 MG/DL — SIGNIFICANT CHANGE UP (ref 0.5–1.3)
EGFR: 100 ML/MIN/1.73M2 — SIGNIFICANT CHANGE UP
EOSINOPHIL # BLD AUTO: 0.14 K/UL — SIGNIFICANT CHANGE UP (ref 0–0.5)
EOSINOPHIL NFR BLD AUTO: 1.8 % — SIGNIFICANT CHANGE UP (ref 0–6)
ESTIMATED AVERAGE GLUCOSE: 108 MG/DL — SIGNIFICANT CHANGE UP (ref 68–114)
GLUCOSE SERPL-MCNC: 97 MG/DL — SIGNIFICANT CHANGE UP (ref 70–99)
HCT VFR BLD CALC: 40.1 % — SIGNIFICANT CHANGE UP (ref 34.5–45)
HGB BLD-MCNC: 13.8 G/DL — SIGNIFICANT CHANGE UP (ref 11.5–15.5)
IMM GRANULOCYTES NFR BLD AUTO: 0.3 % — SIGNIFICANT CHANGE UP (ref 0–1.5)
INR BLD: 1.13 RATIO — SIGNIFICANT CHANGE UP (ref 0.88–1.16)
LYMPHOCYTES # BLD AUTO: 2.17 K/UL — SIGNIFICANT CHANGE UP (ref 1–3.3)
LYMPHOCYTES # BLD AUTO: 28.4 % — SIGNIFICANT CHANGE UP (ref 13–44)
MCHC RBC-ENTMCNC: 32.1 PG — SIGNIFICANT CHANGE UP (ref 27–34)
MCHC RBC-ENTMCNC: 34.4 GM/DL — SIGNIFICANT CHANGE UP (ref 32–36)
MCV RBC AUTO: 93.3 FL — SIGNIFICANT CHANGE UP (ref 80–100)
MONOCYTES # BLD AUTO: 0.61 K/UL — SIGNIFICANT CHANGE UP (ref 0–0.9)
MONOCYTES NFR BLD AUTO: 8 % — SIGNIFICANT CHANGE UP (ref 2–14)
MRSA PCR RESULT.: SIGNIFICANT CHANGE UP
NEUTROPHILS # BLD AUTO: 4.62 K/UL — SIGNIFICANT CHANGE UP (ref 1.8–7.4)
NEUTROPHILS NFR BLD AUTO: 60.5 % — SIGNIFICANT CHANGE UP (ref 43–77)
PLATELET # BLD AUTO: 240 K/UL — SIGNIFICANT CHANGE UP (ref 150–400)
POTASSIUM SERPL-MCNC: 4.2 MMOL/L — SIGNIFICANT CHANGE UP (ref 3.5–5.3)
POTASSIUM SERPL-SCNC: 4.2 MMOL/L — SIGNIFICANT CHANGE UP (ref 3.5–5.3)
PROT SERPL-MCNC: 7.2 G/DL — SIGNIFICANT CHANGE UP (ref 6.6–8.7)
PROTHROM AB SERPL-ACNC: 13.1 SEC — SIGNIFICANT CHANGE UP (ref 10.5–13.4)
RBC # BLD: 4.3 M/UL — SIGNIFICANT CHANGE UP (ref 3.8–5.2)
RBC # FLD: 13.6 % — SIGNIFICANT CHANGE UP (ref 10.3–14.5)
S AUREUS DNA NOSE QL NAA+PROBE: SIGNIFICANT CHANGE UP
SODIUM SERPL-SCNC: 143 MMOL/L — SIGNIFICANT CHANGE UP (ref 135–145)
WBC # BLD: 7.64 K/UL — SIGNIFICANT CHANGE UP (ref 3.8–10.5)
WBC # FLD AUTO: 7.64 K/UL — SIGNIFICANT CHANGE UP (ref 3.8–10.5)

## 2022-08-23 PROCEDURE — 93005 ELECTROCARDIOGRAM TRACING: CPT

## 2022-08-23 PROCEDURE — 93010 ELECTROCARDIOGRAM REPORT: CPT

## 2022-08-23 PROCEDURE — G0463: CPT

## 2022-08-23 NOTE — H&P PST ADULT - CRANIAL NERVE
left facial droop, hearing loss left ear, and deviation of tongue to right side,  cranial nerves V, VII, VII, XII

## 2022-08-23 NOTE — H&P PST ADULT - PROBLEM SELECTOR PLAN 1
preop assessment, medical clearance pending, robotic retrospectus incisional hernia repair w/Dr Glass scheduled for 8/30/2022

## 2022-08-23 NOTE — H&P PST ADULT - MALLAMPATI CLASS
left sided facial paralysis s/p surgery for acoustic neuroma removal, tongue deviates to right/Class II - visualization of the soft palate, fauces, and uvula left sided facial paralysis s/p surgery for acoustic neuroma removal, tongue deviates to left/Class II - visualization of the soft palate, fauces, and uvula

## 2022-08-23 NOTE — H&P PST ADULT - NEGATIVE OPHTHALMOLOGIC SYMPTOMS
wears glasses/no diplopia/no lacrimation L/no lacrimation R/no blurred vision L/no blurred vision R/no pain L/no pain R/no irritation L/no irritation R/no loss of vision L/no loss of vision R/no scleral injection L/no scleral injection R glasses/no diplopia/no lacrimation L/no lacrimation R/no blurred vision L/no blurred vision R/no pain L/no pain R/no irritation L/no irritation R/no loss of vision R/no scleral injection L/no scleral injection R

## 2022-08-23 NOTE — H&P PST ADULT - RESPIRATORY
Adequate: hears normal conversation without difficulty
normal/clear to auscultation bilaterally/no wheezes/no rales/no rhonchi/breath sounds equal/respirations non-labored

## 2022-08-23 NOTE — H&P PST ADULT - HISTORY OF PRESENT ILLNESS
61 yo F PMH of TARA, hypothyroidism, morbid obesity s/p previous sleeve gastrectomy for which she lost about 40 lbs but then subsequently regained the weight back. She has had multiple incisional hernia repairs by Dr Finkelstein complicated by recurrence. Last seen by Dr Ortiz, who recommended pre-repair weight loss in order to optimize her outcomes. She states that she never lost significant weight after her previous sleeve, and feels no restriction when she eats. Her hernia causes significant discomfort and is hindering her ability to perform physical exercise. She denies bowel obstructions or recent episodes of incarceration. Preop assessment prior to robotic retrospectus incisional hernia repair w/Dr Glass scheduled for 8/30/2022   61 yo F PMH of TARA, hypothyroidism, morbid obesity s/p previous sleeve gastrectomy for which she lost about 40 lbs but then subsequently regained the weight back. She has had multiple incisional hernia repairs by Dr Finkelstein complicated by recurrence. Her hernia causes significant discomfort and is hindering her ability to perform physical exercise. She denies bowel obstructions or recent episodes of incarceration. Preop assessment prior to robotic retrospectus incisional hernia repair w/Dr Glass scheduled for 8/30/2022

## 2022-08-23 NOTE — H&P PST ADULT - EYES
eyelid weight is visualized on left central upper eyelid/PERRL/EOMI/conjunctiva clear eyelid implant is visualized on left central upper eyelid/PERRL/EOMI/conjunctiva clear

## 2022-08-23 NOTE — H&P PST ADULT - PALPABLE MASS
Continue to MONITOR CLOSELY to determine the need for TREATMENT and INCREASE/DECREASE in length of time till next follow up visit. large hernia to the left of umbilicus

## 2022-08-23 NOTE — H&P PST ADULT - PHARYNX
tongue deviates to right/no redness/no discharge/no peritonsillar abscess tongue deviates to left/no redness/no discharge/no peritonsillar abscess

## 2022-08-23 NOTE — H&P PST ADULT - ASSESSMENT
61 yo F PMH of TARA, hypothyroidism, morbid obesity s/p previous sleeve gastrectomy for which she lost about 40 lbs but then subsequently regained the weight back. She has had multiple incisional hernia repairs by Dr Finkelstein complicated by recurrence. Last seen by Dr Ortiz, who recommended pre-repair weight loss in order to optimize her outcomes. She states that she never lost significant weight after her previous sleeve, and feels no restriction when she eats. Her hernia causes significant discomfort and is hindering her ability to perform physical exercise. She denies bowel obstructions or recent episodes of incarceration. Preop assessment prior to robotic retrospectus incisional hernia repair w/Dr Glass scheduled for 2022    Pt was educated on preop preparation with written and verbal instructions. Pt was informed to obtain clearances >3 days before surgery. Pt was educated on NSAIDs, multivitamins and herbals that increase the risk of bleeding and need to be stopped 7 days before procedure. Pt was educated on covid testing and covid prevention, i.e. social distancing, handwashing, mask wearing. Pt verbalized understanding of the above.     OPIOID RISK TOOL    FRANCISCA EACH BOX THAT APPLIES AND ADD TOTALS AT THE END    FAMILY HISTORY OF SUBSTANCE ABUSE                 FEMALE         MALE                                                Alcohol                             [  ]1 pt          [  ]3pts                                               Illegal Durgs                     [  ]2 pts        [  ]3pts                                               Rx Drugs                           [  ]4 pts        [  ]4 pts    PERSONAL HISTORY OF SUBSTANCE ABUSE                                                                                          Alcohol                             [  ]3 pts       [  ]3 pts                                               Illegal Drugs                     [  ]4 pts        [  ]4 pts                                               Rx Drugs                           [  ]5 pts        [  ]5 pts    AGE BETWEEN 16-45 YEARS                                      [  ]1 pt         [  ]1 pt    HISTORY OF PREADOLESCENT   SEXUAL ABUSE                                                             [  ]3 pts        [  ]0pts    PSYCHOLOGICAL DISEASE                     ADD, OCD, Bipolar, Schizophrenia        [  ]2 pts         [  ]2 pts                      Depression                                               [  ]1 pt           [  ]1 pt           SCORING TOTAL   (add numbers and type here)              ( 0 )                                     A score of 3 or lower indicated LOW risk for future opioid abuse  A score of 4 to 7 indicated moderate risk for future opioid abuse  A score of 8 or higher indicates a high risk for opioid abuse    CAPRINI VTE 2.0 SCORE [CLOT updated 2019]    AGE RELATED RISK FACTORS                                                       MOBILITY RELATED FACTORS  [ ] Age 41-60 years                                            (1 Point)                    [ ] Bed rest                                                        (1 Point)  [x ] Age: 61-74 years                                           (2 Points)                  [ ] Plaster cast                                                   (2 Points)  [ ] Age= 75 years                                              (3 Points)                    [ ] Bed bound for more than 72 hours                 (2 Points)    DISEASE RELATED RISK FACTORS                                               GENDER SPECIFIC FACTORS  [ ] Edema in the lower extremities                       (1 Point)              [ ] Pregnancy                                                     (1 Point)  [ ] Varicose veins                                               (1 Point)                     [ ] Post-partum < 6 weeks                                   (1 Point)             [ x] BMI > 25 Kg/m2                                            (1 Point)                     [ ] Hormonal therapy  or oral contraception          (1 Point)                 [ ] Sepsis (in the previous month)                        (1 Point)               [ ] History of pregnancy complications                 (1 point)  [ ] Pneumonia or serious lung disease                                               [ ] Unexplained or recurrent                     (1 Point)           (in the previous month)                               (1 Point)  [ ] Abnormal pulmonary function test                     (1 Point)                 SURGERY RELATED RISK FACTORS  [ ] Acute myocardial infarction                              (1 Point)               [ ]  Section                                             (1 Point)  [ ] Congestive heart failure (in the previous month)  (1 Point)      [ ] Minor surgery                                                  (1 Point)   [ ] Inflammatory bowel disease                             (1 Point)               [ ] Arthroscopic surgery                                        (2 Points)  [ ] Central venous access                                      (2 Points)                [x ] General surgery lasting more than 45 minutes (2 points)  [ ] Malignancy- Present or previous                   (2 Points)                [ ] Elective arthroplasty                                         (5 points)    [ ] Stroke (in the previous month)                          (5 Points)                                                                                                                                                           HEMATOLOGY RELATED FACTORS                                                 TRAUMA RELATED RISK FACTORS  [ ] Prior episodes of VTE                                     (3 Points)                [ ] Fracture of the hip, pelvis, or leg                       (5 Points)  [ ] Positive family history for VTE                         (3 Points)             [ ] Acute spinal cord injury (in the previous month)  (5 Points)  [ ] Prothrombin 51744 A                                     (3 Points)               [ ] Paralysis  (less than 1 month)                             (5 Points)  [ ] Factor V Leiden                                             (3 Points)                  [ ] Multiple Trauma within 1 month                        (5 Points)  [ ] Lupus anticoagulants                                     (3 Points)                                                           [ ] Anticardiolipin antibodies                               (3 Points)                                                       [ ] High homocysteine in the blood                      (3 Points)                                             [ ] Other congenital or acquired thrombophilia      (3 Points)                                                [ ] Heparin induced thrombocytopenia                  (3 Points)                                     Total Score [     5     ] 61 yo F PMH of TARA, hypothyroidism, morbid obesity s/p previous sleeve gastrectomy for which she lost about 40 lbs but then subsequently regained the weight back. She has had multiple incisional hernia repairs by Dr Finkelstein complicated by recurrence. Her hernia causes significant discomfort and is hindering her ability to perform physical exercise. She denies bowel obstructions or recent episodes of incarceration. Preop assessment prior to robotic retrospectus incisional hernia repair w/Dr Glass scheduled for 2022    Pt was educated on preop preparation with written and verbal instructions. Pt was informed to obtain clearance >3 days before surgery. Pt was educated on NSAIDs, multivitamins and herbals that increase the risk of bleeding and need to be stopped 7 days before procedure. Pt was educated on covid testing and covid prevention, i.e. social distancing, handwashing, mask wearing. Pt verbalized understanding of the above.     OPIOID RISK TOOL    FRANCISCA EACH BOX THAT APPLIES AND ADD TOTALS AT THE END    FAMILY HISTORY OF SUBSTANCE ABUSE                 FEMALE         MALE                                                Alcohol                             [  ]1 pt          [  ]3pts                                               Illegal Durgs                     [  ]2 pts        [  ]3pts                                               Rx Drugs                           [  ]4 pts        [  ]4 pts    PERSONAL HISTORY OF SUBSTANCE ABUSE                                                                                          Alcohol                             [  ]3 pts       [  ]3 pts                                               Illegal Drugs                     [  ]4 pts        [  ]4 pts                                               Rx Drugs                           [  ]5 pts        [  ]5 pts    AGE BETWEEN 16-45 YEARS                                      [  ]1 pt         [  ]1 pt    HISTORY OF PREADOLESCENT   SEXUAL ABUSE                                                             [  ]3 pts        [  ]0pts    PSYCHOLOGICAL DISEASE                     ADD, OCD, Bipolar, Schizophrenia        [  ]2 pts         [  ]2 pts                      Depression                                               [  ]1 pt           [  ]1 pt           SCORING TOTAL   (add numbers and type here)              ( 0 )                                     A score of 3 or lower indicated LOW risk for future opioid abuse  A score of 4 to 7 indicated moderate risk for future opioid abuse  A score of 8 or higher indicates a high risk for opioid abuse    CAPRINI VTE 2.0 SCORE [CLOT updated 2019]    AGE RELATED RISK FACTORS                                                       MOBILITY RELATED FACTORS  [ ] Age 41-60 years                                            (1 Point)                    [ ] Bed rest                                                        (1 Point)  [x ] Age: 61-74 years                                           (2 Points)                  [ ] Plaster cast                                                   (2 Points)  [ ] Age= 75 years                                              (3 Points)                    [ ] Bed bound for more than 72 hours                 (2 Points)    DISEASE RELATED RISK FACTORS                                               GENDER SPECIFIC FACTORS  [ ] Edema in the lower extremities                       (1 Point)              [ ] Pregnancy                                                     (1 Point)  [ ] Varicose veins                                               (1 Point)                     [ ] Post-partum < 6 weeks                                   (1 Point)             [ x] BMI > 25 Kg/m2                                            (1 Point)                     [ ] Hormonal therapy  or oral contraception          (1 Point)                 [ ] Sepsis (in the previous month)                        (1 Point)               [ ] History of pregnancy complications                 (1 point)  [ ] Pneumonia or serious lung disease                                               [ ] Unexplained or recurrent                     (1 Point)           (in the previous month)                               (1 Point)  [ ] Abnormal pulmonary function test                     (1 Point)                 SURGERY RELATED RISK FACTORS  [ ] Acute myocardial infarction                              (1 Point)               [ ]  Section                                             (1 Point)  [ ] Congestive heart failure (in the previous month)  (1 Point)      [ ] Minor surgery                                                  (1 Point)   [ ] Inflammatory bowel disease                             (1 Point)               [ ] Arthroscopic surgery                                        (2 Points)  [ ] Central venous access                                      (2 Points)                [x ] General surgery lasting more than 45 minutes (2 points)  [ ] Malignancy- Present or previous                   (2 Points)                [ ] Elective arthroplasty                                         (5 points)    [ ] Stroke (in the previous month)                          (5 Points)                                                                                                                                                           HEMATOLOGY RELATED FACTORS                                                 TRAUMA RELATED RISK FACTORS  [ ] Prior episodes of VTE                                     (3 Points)                [ ] Fracture of the hip, pelvis, or leg                       (5 Points)  [ ] Positive family history for VTE                         (3 Points)             [ ] Acute spinal cord injury (in the previous month)  (5 Points)  [ ] Prothrombin 93288 A                                     (3 Points)               [ ] Paralysis  (less than 1 month)                             (5 Points)  [ ] Factor V Leiden                                             (3 Points)                  [ ] Multiple Trauma within 1 month                        (5 Points)  [ ] Lupus anticoagulants                                     (3 Points)                                                           [ ] Anticardiolipin antibodies                               (3 Points)                                                       [ ] High homocysteine in the blood                      (3 Points)                                             [ ] Other congenital or acquired thrombophilia      (3 Points)                                                [ ] Heparin induced thrombocytopenia                  (3 Points)                                     Total Score [     5     ]

## 2022-08-23 NOTE — H&P PST ADULT - NEGATIVE NEUROLOGICAL SYMPTOMS
numbness on left side of face/no weakness/no generalized seizures/no focal seizures/no syncope/no vertigo/no loss of sensation no weakness/no generalized seizures/no focal seizures/no syncope/no vertigo

## 2022-08-23 NOTE — H&P PST ADULT - NEGATIVE ENMT SYMPTOMS
no ear pain/no tinnitus/no vertigo/no nasal congestion/no nasal discharge/no nose bleeds/no dysphagia

## 2022-08-23 NOTE — H&P PST ADULT - NEGATIVE CARDIOVASCULAR SYMPTOMS
no chest pain/no palpitations/no dyspnea on exertion/no orthopnea/no paroxysmal nocturnal dyspnea/no peripheral edema/no claudication no chest pain/no palpitations/no dyspnea on exertion/no orthopnea/no peripheral edema/no claudication

## 2022-08-23 NOTE — H&P PST ADULT - NEUROLOGICAL COMMENTS
numbness on left side of face left facial droop, hearing loss in left ear, and deviation of tongue to left side s/p left acoustic neuroma surgery

## 2022-08-26 LAB — SARS-COV-2 N GENE NPH QL NAA+PROBE: NOT DETECTED

## 2022-08-29 ENCOUNTER — TRANSCRIPTION ENCOUNTER (OUTPATIENT)
Age: 62
End: 2022-08-29

## 2022-08-30 ENCOUNTER — TRANSCRIPTION ENCOUNTER (OUTPATIENT)
Age: 62
End: 2022-08-30

## 2022-08-30 ENCOUNTER — OUTPATIENT (OUTPATIENT)
Dept: INPATIENT UNIT | Facility: HOSPITAL | Age: 62
LOS: 1 days | End: 2022-08-30
Payer: COMMERCIAL

## 2022-08-30 ENCOUNTER — RESULT REVIEW (OUTPATIENT)
Age: 62
End: 2022-08-30

## 2022-08-30 ENCOUNTER — APPOINTMENT (OUTPATIENT)
Dept: SURGERY | Facility: HOSPITAL | Age: 62
End: 2022-08-30

## 2022-08-30 VITALS
SYSTOLIC BLOOD PRESSURE: 134 MMHG | TEMPERATURE: 98 F | DIASTOLIC BLOOD PRESSURE: 70 MMHG | RESPIRATION RATE: 16 BRPM | WEIGHT: 207.23 LBS | OXYGEN SATURATION: 98 % | HEART RATE: 66 BPM | HEIGHT: 61 IN

## 2022-08-30 VITALS
SYSTOLIC BLOOD PRESSURE: 114 MMHG | RESPIRATION RATE: 16 BRPM | TEMPERATURE: 97 F | HEART RATE: 64 BPM | DIASTOLIC BLOOD PRESSURE: 63 MMHG

## 2022-08-30 DIAGNOSIS — Z98.890 OTHER SPECIFIED POSTPROCEDURAL STATES: Chronic | ICD-10-CM

## 2022-08-30 DIAGNOSIS — Z98.89 OTHER SPECIFIED POSTPROCEDURAL STATES: Chronic | ICD-10-CM

## 2022-08-30 DIAGNOSIS — Z90.710 ACQUIRED ABSENCE OF BOTH CERVIX AND UTERUS: Chronic | ICD-10-CM

## 2022-08-30 DIAGNOSIS — K43.2 INCISIONAL HERNIA WITHOUT OBSTRUCTION OR GANGRENE: ICD-10-CM

## 2022-08-30 LAB — GLUCOSE BLDC GLUCOMTR-MCNC: 82 MG/DL — SIGNIFICANT CHANGE UP (ref 70–99)

## 2022-08-30 PROCEDURE — C1781: CPT

## 2022-08-30 PROCEDURE — 88300 SURGICAL PATH GROSS: CPT

## 2022-08-30 PROCEDURE — 49652: CPT

## 2022-08-30 PROCEDURE — 49654: CPT | Mod: 22,58

## 2022-08-30 PROCEDURE — 49654: CPT | Mod: AS,22,58

## 2022-08-30 PROCEDURE — 82962 GLUCOSE BLOOD TEST: CPT

## 2022-08-30 PROCEDURE — 36415 COLL VENOUS BLD VENIPUNCTURE: CPT

## 2022-08-30 PROCEDURE — 88300 SURGICAL PATH GROSS: CPT | Mod: 26

## 2022-08-30 PROCEDURE — S2900 ROBOTIC SURGICAL SYSTEM: CPT | Mod: NC

## 2022-08-30 PROCEDURE — S2900: CPT

## 2022-08-30 DEVICE — MESH SYMBOTEX 20MM: Type: IMPLANTABLE DEVICE | Status: FUNCTIONAL

## 2022-08-30 RX ORDER — CELECOXIB 200 MG/1
400 CAPSULE ORAL ONCE
Refills: 0 | Status: DISCONTINUED | OUTPATIENT
Start: 2022-08-30 | End: 2022-08-30

## 2022-08-30 RX ORDER — CEFAZOLIN SODIUM 1 G
2000 VIAL (EA) INJECTION ONCE
Refills: 0 | Status: DISCONTINUED | OUTPATIENT
Start: 2022-08-30 | End: 2022-09-13

## 2022-08-30 RX ORDER — HYDROMORPHONE HYDROCHLORIDE 2 MG/ML
0.5 INJECTION INTRAMUSCULAR; INTRAVENOUS; SUBCUTANEOUS
Refills: 0 | Status: DISCONTINUED | OUTPATIENT
Start: 2022-08-30 | End: 2022-08-30

## 2022-08-30 RX ORDER — HEPARIN SODIUM 5000 [USP'U]/ML
5000 INJECTION INTRAVENOUS; SUBCUTANEOUS ONCE
Refills: 0 | Status: DISCONTINUED | OUTPATIENT
Start: 2022-08-30 | End: 2022-08-30

## 2022-08-30 RX ORDER — ACETAMINOPHEN 500 MG
975 TABLET ORAL ONCE
Refills: 0 | Status: COMPLETED | OUTPATIENT
Start: 2022-08-30 | End: 2022-08-30

## 2022-08-30 RX ORDER — SODIUM CHLORIDE 9 MG/ML
3 INJECTION INTRAMUSCULAR; INTRAVENOUS; SUBCUTANEOUS EVERY 8 HOURS
Refills: 0 | Status: DISCONTINUED | OUTPATIENT
Start: 2022-08-30 | End: 2022-08-30

## 2022-08-30 RX ORDER — FENTANYL CITRATE 50 UG/ML
25 INJECTION INTRAVENOUS
Refills: 0 | Status: DISCONTINUED | OUTPATIENT
Start: 2022-08-30 | End: 2022-08-30

## 2022-08-30 RX ORDER — BUPIVACAINE 13.3 MG/ML
20 INJECTION, SUSPENSION, LIPOSOMAL INFILTRATION ONCE
Refills: 0 | Status: DISCONTINUED | OUTPATIENT
Start: 2022-08-30 | End: 2022-08-30

## 2022-08-30 RX ORDER — ONDANSETRON 8 MG/1
4 TABLET, FILM COATED ORAL ONCE
Refills: 0 | Status: COMPLETED | OUTPATIENT
Start: 2022-08-30 | End: 2022-08-30

## 2022-08-30 RX ADMIN — ONDANSETRON 4 MILLIGRAM(S): 8 TABLET, FILM COATED ORAL at 16:47

## 2022-08-30 RX ADMIN — HYDROMORPHONE HYDROCHLORIDE 0.5 MILLIGRAM(S): 2 INJECTION INTRAMUSCULAR; INTRAVENOUS; SUBCUTANEOUS at 15:45

## 2022-08-30 RX ADMIN — Medication 975 MILLIGRAM(S): at 09:45

## 2022-08-30 RX ADMIN — HYDROMORPHONE HYDROCHLORIDE 0.5 MILLIGRAM(S): 2 INJECTION INTRAMUSCULAR; INTRAVENOUS; SUBCUTANEOUS at 16:10

## 2022-08-30 RX ADMIN — HYDROMORPHONE HYDROCHLORIDE 0.5 MILLIGRAM(S): 2 INJECTION INTRAMUSCULAR; INTRAVENOUS; SUBCUTANEOUS at 15:15

## 2022-08-30 RX ADMIN — HYDROMORPHONE HYDROCHLORIDE 0.5 MILLIGRAM(S): 2 INJECTION INTRAMUSCULAR; INTRAVENOUS; SUBCUTANEOUS at 15:55

## 2022-08-30 RX ADMIN — SODIUM CHLORIDE 3 MILLILITER(S): 9 INJECTION INTRAMUSCULAR; INTRAVENOUS; SUBCUTANEOUS at 09:36

## 2022-08-30 RX ADMIN — HYDROMORPHONE HYDROCHLORIDE 0.5 MILLIGRAM(S): 2 INJECTION INTRAMUSCULAR; INTRAVENOUS; SUBCUTANEOUS at 15:25

## 2022-08-30 RX ADMIN — HYDROMORPHONE HYDROCHLORIDE 0.5 MILLIGRAM(S): 2 INJECTION INTRAMUSCULAR; INTRAVENOUS; SUBCUTANEOUS at 15:40

## 2022-08-30 NOTE — ASU DISCHARGE PLAN (ADULT/PEDIATRIC) - CARE PROVIDER_API CALL
Morgan Glass)  Surgery  [address] [place] [address], [address] [place] [address]  Phone: [phone]  Fax: [phone]  Follow Up Time:

## 2022-08-30 NOTE — ASU DISCHARGE PLAN (ADULT/PEDIATRIC) - NS MD DC FALL RISK RISK
For information on Fall & Injury Prevention, visit: [email or web address] OR  [email or web address] OR  https://www.cdc.gov/steadi/patient.html

## 2022-08-30 NOTE — BRIEF OPERATIVE NOTE - OPERATION/FINDINGS
Abd entered using Veress and ports placed under direct visualization. Hernia defects identified and reduced. prior mesh excised. [id number] symbotex mesh placed over defects and secured in place with 2.0 vloc. old mesh removed via 12mm port. fascia at 12mm port closed lap with 0vicryl . ports closed with monocryl and dermabond.  TAP block performed by anesthesia

## 2022-08-30 NOTE — ASU DISCHARGE PLAN (ADULT/PEDIATRIC) - ASU DC SPECIAL INSTRUCTIONSFT
BATHING: Please do not submerge wound underwater. You may shower starting post op day #1. You have dermabond on your incisions  (purple looking skin glue) do not scrub or pick. It will come off on its on. You may pat it dry after showering.   ACTIVITY: No heavy lifting or straining. Otherwise, you may return to your usual level of physical activity. If you are taking narcotic pain medication (such as Percocet) DO NOT drive a car, operate machinery or make important decisions.   DIET: Please return to your normal diet. Encourage protein filled liquids.     RETURN TO THE EMERGENCY DEPARTMENT for any of the following - worsening pain, fever/chills, nausea/vomiting, altered mental status, chest pain, shortness of breath, or any other new / worsening symptom. to your usual diet.   NOTIFY YOUR SURGEON IF: You have any bleeding that does not stop, any pus draining from your wound(s), any fever (over 100.4 F) or chills, persistent nausea/vomiting, persistent diarrhea, or if your pain is not controlled on your discharge medications   FOLLOW-UP: Please follow up with your primary care physician within 3-4weeks after surgery and your surgeon  within 2 weeks of surgery.  Use tylenol and motrin for pain control , you may also use ice packs to any area of swelling/soreness. Do not place ice pack directly on skin.

## 2022-08-31 LAB — SURGICAL PATHOLOGY STUDY: SIGNIFICANT CHANGE UP

## 2022-09-01 ENCOUNTER — APPOINTMENT (OUTPATIENT)
Dept: SURGERY | Facility: CLINIC | Age: 62
End: 2022-09-01

## 2022-09-01 VITALS
BODY MASS INDEX: 39.65 KG/M2 | RESPIRATION RATE: 16 BRPM | OXYGEN SATURATION: 96 % | HEIGHT: 61 IN | HEART RATE: 86 BPM | DIASTOLIC BLOOD PRESSURE: 83 MMHG | TEMPERATURE: 96.5 F | SYSTOLIC BLOOD PRESSURE: 138 MMHG | WEIGHT: 210 LBS

## 2022-09-01 DIAGNOSIS — Z87.19 OTHER SPECIFIED POSTPROCEDURAL STATES: ICD-10-CM

## 2022-09-01 DIAGNOSIS — G89.18 OTHER ACUTE POSTPROCEDURAL PAIN: ICD-10-CM

## 2022-09-01 DIAGNOSIS — Z98.890 OTHER SPECIFIED POSTPROCEDURAL STATES: ICD-10-CM

## 2022-09-01 PROCEDURE — 99024 POSTOP FOLLOW-UP VISIT: CPT

## 2022-09-01 RX ORDER — KETOROLAC TROMETHAMINE 60 MG/2ML
60 INJECTION, SOLUTION INTRAMUSCULAR
Qty: 1 | Refills: 0 | Status: COMPLETED | OUTPATIENT
Start: 2022-09-01

## 2022-09-01 RX ADMIN — KETOROLAC TROMETHAMINE 0 MG/2ML: 30 INJECTION, SOLUTION INTRAMUSCULAR at 00:00

## 2022-09-01 NOTE — HISTORY OF PRESENT ILLNESS
[de-identified] : Ms. ORTIZ CASTILLO is a 62 year-old woman who presented with recurrent ventral hernia s/p ventral hernia repair on 8/30/2022, who comes in today with c/o postoperative pain, lower right abdomen. Admits to taking both tylenol and motrin with little relief. Denies fever or chills. No redness or pain at incision sites. Slowly tolerating diet. \par

## 2022-09-01 NOTE — ASSESSMENT
[FreeTextEntry1] : Ms. Chelsea Harrison is a pleasant 62 year old female s/p ventral hernia repair done on 8/30/22. Here today with postoperative pain (POD #2). Pain management discussed at length. Comfort measures provided. Toradol given today in office. Surgical incision site clean, dry & intact, no s/s infection. Patient advised to continue tylenol today & advise office if pain persists. Increase fluids & continue bariatric diet. Patient and family member agreeable to plan. Confirmed postop appointment.

## 2022-09-01 NOTE — PHYSICAL EXAM
[Normal Thyroid] : the thyroid was normal [Normal Breath Sounds] : Normal breath sounds [Wheezing] : wheezing was heard [Normal Heart Sounds] : normal heart sounds [Normal Rate and Rhythm] : normal rate and rhythm [No HSM] : no hepatosplenomegaly [No Rash or Lesion] : No rash or lesion [Alert] : alert [Oriented to Person] : oriented to person [Oriented to Place] : oriented to place [Oriented to Time] : oriented to time [Calm] : calm [Purpura] : no purpura  [Petechiae] : no petechiae [Skin Ulcer] : no ulcer [Skin Induration] : no induration [de-identified] : NAD [de-identified] : anicteric, mucous membranes moist  [de-identified] : abdomen soft nondistended, tenderness right lower abdomen, no guarding, surgical incisions CDI, no redness or drainage [de-identified] : no CVAT  [de-identified] : grossly intact

## 2022-09-12 ENCOUNTER — APPOINTMENT (OUTPATIENT)
Dept: SURGERY | Facility: CLINIC | Age: 62
End: 2022-09-12

## 2022-09-12 VITALS
BODY MASS INDEX: 38.51 KG/M2 | HEIGHT: 61 IN | RESPIRATION RATE: 16 BRPM | WEIGHT: 204 LBS | DIASTOLIC BLOOD PRESSURE: 79 MMHG | OXYGEN SATURATION: 98 % | TEMPERATURE: 96.1 F | SYSTOLIC BLOOD PRESSURE: 119 MMHG | HEART RATE: 66 BPM

## 2022-09-12 PROCEDURE — 99024 POSTOP FOLLOW-UP VISIT: CPT

## 2022-09-12 NOTE — HISTORY OF PRESENT ILLNESS
[de-identified] : This patient is a pleasant 62-year-old female who is 2 weeks status post robotic incisional hernia repair and 3 months status post a gastric bypass for preoperative weight loss.  Overall she is doing quite well.  Her pain is improving slowly.  She has occasional discomfort in the lower abdomen.  Denies fever/chills.  She is eating well and having normal bowel function.

## 2022-09-12 NOTE — ASSESSMENT
[FreeTextEntry1] : Doing very well 2 weeks status post robotic incisional hernia repair.  I told her that the seroma will decrease with time, which it already has.  She should follow-up for her 6-month postoperative bariatric follow-up with repeat bariatric labs.  She will also follow-up with the bariatric dietitian at that time.  All questions answered.

## 2022-09-12 NOTE — PHYSICAL EXAM
[Normal Breath Sounds] : Normal breath sounds [Normal Heart Sounds] : normal heart sounds [Normal Rate and Rhythm] : normal rate and rhythm [No HSM] : no hepatosplenomegaly [Calm] : calm [de-identified] : Well-appearing, no acute distress [de-identified] : JASSI [de-identified] : Soft, nondistended, nontender, incisions clean/dry/intact, palpable seroma in the upper midline. [de-identified] : No CVA tenderness [de-identified] : Full range of motion

## 2022-11-29 DIAGNOSIS — Z13.21 ENCOUNTER FOR SCREENING FOR NUTRITIONAL DISORDER: ICD-10-CM

## 2022-12-05 LAB
25(OH)D3 SERPL-MCNC: 29.5 NG/ML
ALBUMIN SERPL ELPH-MCNC: 4 G/DL
ALP BLD-CCNC: 124 U/L
ALT SERPL-CCNC: 21 U/L
ANION GAP SERPL CALC-SCNC: 10 MMOL/L
AST SERPL-CCNC: 22 U/L
BASOPHILS # BLD AUTO: 0.11 K/UL
BASOPHILS NFR BLD AUTO: 1.7 %
BILIRUB SERPL-MCNC: 0.4 MG/DL
BUN SERPL-MCNC: 7 MG/DL
CALCIUM SERPL-MCNC: 9.3 MG/DL
CHLORIDE SERPL-SCNC: 106 MMOL/L
CO2 SERPL-SCNC: 26 MMOL/L
CREAT SERPL-MCNC: 0.67 MG/DL
EGFR: 98 ML/MIN/1.73M2
EOSINOPHIL # BLD AUTO: 0.34 K/UL
EOSINOPHIL NFR BLD AUTO: 5.3 %
ESTIMATED AVERAGE GLUCOSE: 114 MG/DL
FOLATE SERPL-MCNC: 16.4 NG/ML
GLUCOSE SERPL-MCNC: 84 MG/DL
HBA1C MFR BLD HPLC: 5.6 %
HCT VFR BLD CALC: 40.6 %
HGB BLD-MCNC: 13.4 G/DL
IMM GRANULOCYTES NFR BLD AUTO: 0.2 %
IRON SERPL-MCNC: 109 UG/DL
LYMPHOCYTES # BLD AUTO: 2.35 K/UL
LYMPHOCYTES NFR BLD AUTO: 36.8 %
MAN DIFF?: NORMAL
MCHC RBC-ENTMCNC: 30.8 PG
MCHC RBC-ENTMCNC: 33 GM/DL
MCV RBC AUTO: 93.3 FL
MONOCYTES # BLD AUTO: 0.65 K/UL
MONOCYTES NFR BLD AUTO: 10.2 %
NEUTROPHILS # BLD AUTO: 2.92 K/UL
NEUTROPHILS NFR BLD AUTO: 45.8 %
PLATELET # BLD AUTO: 265 K/UL
POTASSIUM SERPL-SCNC: 4.8 MMOL/L
PROT SERPL-MCNC: 6.4 G/DL
RBC # BLD: 4.35 M/UL
RBC # FLD: 13.9 %
SODIUM SERPL-SCNC: 142 MMOL/L
VIT B12 SERPL-MCNC: 487 PG/ML
WBC # FLD AUTO: 6.38 K/UL

## 2022-12-06 ENCOUNTER — NON-APPOINTMENT (OUTPATIENT)
Age: 62
End: 2022-12-06

## 2022-12-08 LAB
VIT B6 SERPL-MCNC: 16.9 UG/L
ZINC SERPL-MCNC: 66 UG/DL

## 2022-12-09 LAB — VIT B1 SERPL-MCNC: 134.1 NMOL/L

## 2022-12-12 ENCOUNTER — APPOINTMENT (OUTPATIENT)
Dept: SURGERY | Facility: CLINIC | Age: 62
End: 2022-12-12

## 2022-12-12 VITALS
RESPIRATION RATE: 16 BRPM | OXYGEN SATURATION: 98 % | HEART RATE: 70 BPM | BODY MASS INDEX: 37.05 KG/M2 | WEIGHT: 196.25 LBS | HEIGHT: 61 IN | SYSTOLIC BLOOD PRESSURE: 134 MMHG | DIASTOLIC BLOOD PRESSURE: 78 MMHG | TEMPERATURE: 96 F

## 2022-12-12 PROCEDURE — 99213 OFFICE O/P EST LOW 20 MIN: CPT

## 2022-12-12 NOTE — HISTORY OF PRESENT ILLNESS
[de-identified] : This patient is a pleasant 62-year-old female who is 2 weeks status post robotic incisional hernia repair and 3 months status post a gastric bypass for preoperative weight loss.  Overall she is doing quite well.  Her pain is improving slowly.  She has occasional discomfort in the lower abdomen.  Denies fever/chills.  She is eating well and having normal bowel function. [de-identified] : 12/12/22 Update:\par Patient presents for routine follow-up status post robotic incisional hernia repair as well as gastric bypass in June.  Overall she is doing very well and has no more abdominal pain or bulge.  She states that she has been losing weight and her current weight is 196 pounds.  She would like to get down to 150 pounds.  She states that she is engaging in minimal physical activity.  Labs showed mild vitamin D deficiency, otherwise normal.\par No fever/chills, dysphagia.  Normal bowel function.

## 2022-12-12 NOTE — ASSESSMENT
[FreeTextEntry1] : 62-year-old female doing well status post gastric bypass as well as robotic incisional hernia repair.  She is losing weight but would like to lose even more.  She needs to engage in more physical activity and cardiovascular exercise.  This will accelerate her weight loss.  She can follow-up for her 12-month postoperative visit with repeat labs.  All questions answered.

## 2022-12-12 NOTE — REVIEW OF SYSTEMS
[Fever] : no fever [Chills] : no chills [Eye Pain] : no eye pain [Red Eyes] : eyes not red [Earache] : no earache [Loss Of Hearing] : no hearing loss [Nosebleeds] : no nosebleeds [Heart Rate Is Slow] : the heart rate was not slow [Heart Rate Is Fast] : the heart rate was not fast [Chest Pain] : no chest pain [Palpitations] : no palpitations [Shortness Of Breath] : no shortness of breath [Wheezing] : no wheezing [Cough] : no cough [Abdominal Pain] : no abdominal pain [Vomiting] : no vomiting [Constipation] : no constipation [Diarrhea] : no diarrhea [Dysuria] : no dysuria [Incontinence] : no incontinence [Joint Swelling] : no joint swelling [Joint Stiffness] : no joint stiffness [Skin Lesions] : no skin lesions [Skin Wound] : no skin wound [Confused] : no confusion [Convulsions] : no convulsions

## 2022-12-12 NOTE — PHYSICAL EXAM
[Normal Breath Sounds] : Normal breath sounds [Normal Heart Sounds] : normal heart sounds [Normal Rate and Rhythm] : normal rate and rhythm [No HSM] : no hepatosplenomegaly [Calm] : calm [de-identified] : Well-appearing, no acute distress [de-identified] : JASSI [de-identified] : Soft, nondistended, nontender, incisions clean/dry/intact, previous seroma resolved. [de-identified] : No CVA tenderness [de-identified] : Full range of motion

## 2023-02-13 ENCOUNTER — APPOINTMENT (OUTPATIENT)
Dept: SURGERY | Facility: CLINIC | Age: 63
End: 2023-02-13
Payer: COMMERCIAL

## 2023-02-13 VITALS
RESPIRATION RATE: 16 BRPM | WEIGHT: 199 LBS | SYSTOLIC BLOOD PRESSURE: 118 MMHG | OXYGEN SATURATION: 98 % | HEART RATE: 59 BPM | TEMPERATURE: 96 F | DIASTOLIC BLOOD PRESSURE: 78 MMHG | HEIGHT: 61 IN | BODY MASS INDEX: 37.57 KG/M2

## 2023-02-13 DIAGNOSIS — Z90.3 ACQUIRED ABSENCE OF STOMACH [PART OF]: ICD-10-CM

## 2023-02-13 DIAGNOSIS — E66.01 MORBID (SEVERE) OBESITY DUE TO EXCESS CALORIES: ICD-10-CM

## 2023-02-13 PROCEDURE — 99213 OFFICE O/P EST LOW 20 MIN: CPT

## 2023-02-13 NOTE — ASSESSMENT
[FreeTextEntry1] : 62-year-old female status post gastric bypass as well as robotic incisional hernia repair.  She is frustrated with her lack of recent weight loss despite healthy eating and physical activity.  She also has recent onset crampy abdominal pain and nausea at various points in the day.  I would like to obtain a CT abdomen/pelvis to assess contrast transit through her small bowel.  It is also possible that her weight regain and pain are due to a gastrogastric fistula.

## 2023-02-13 NOTE — REVIEW OF SYSTEMS
[Fever] : no fever [Chills] : no chills [Eye Pain] : no eye pain [Red Eyes] : eyes not red [Earache] : no earache [Loss Of Hearing] : no hearing loss [Heart Rate Is Slow] : the heart rate was not slow [Heart Rate Is Fast] : the heart rate was not fast [Shortness Of Breath] : no shortness of breath [Wheezing] : no wheezing [Abdominal Pain] : abdominal pain [Vomiting] : no vomiting [Constipation] : no constipation [Diarrhea] : no diarrhea [Dysuria] : no dysuria [Incontinence] : no incontinence [Joint Swelling] : no joint swelling [Joint Stiffness] : no joint stiffness [Skin Lesions] : no skin lesions [Skin Wound] : no skin wound

## 2023-02-13 NOTE — PHYSICAL EXAM
[Normal Breath Sounds] : Normal breath sounds [Normal Heart Sounds] : normal heart sounds [Normal Rate and Rhythm] : normal rate and rhythm [No HSM] : no hepatosplenomegaly [Calm] : calm [de-identified] : Well-appearing, no acute distress [de-identified] : JASSI [de-identified] : Soft, nondistended, nontender, incisions clean/dry/intact, previous seroma resolved. [de-identified] : No CVA tenderness [de-identified] : Full range of motion

## 2023-02-13 NOTE — HISTORY OF PRESENT ILLNESS
[de-identified] : This patient is a pleasant 62-year-old female who is 2 weeks status post robotic incisional hernia repair and 3 months status post a gastric bypass for preoperative weight loss.  Overall she is doing quite well.  Her pain is improving slowly.  She has occasional discomfort in the lower abdomen.  Denies fever/chills.  She is eating well and having normal bowel function. [de-identified] : 12/12/22 Update:\par Patient presents for routine follow-up status post robotic incisional hernia repair as well as gastric bypass in June.  Overall she is doing very well and has no more abdominal pain or bulge.  She states that she has been losing weight and her current weight is 196 pounds.  She would like to get down to 150 pounds.  She states that she is engaging in minimal physical activity.  Labs showed mild vitamin D deficiency, otherwise normal.\par No fever/chills, dysphagia.  Normal bowel function.\par \par 2/13/23:\par Patient presents with new onset crampy abdominal pain that is worse at the end of the day.  She is tolerating adequate amount of regular foods.  She states she is drinking between 30 and 40 ounces of water per day.  She also states that when she wakes up she has nausea first thing in the morning.

## 2023-03-03 ENCOUNTER — APPOINTMENT (OUTPATIENT)
Dept: SURGERY | Facility: CLINIC | Age: 63
End: 2023-03-03
Payer: COMMERCIAL

## 2023-03-03 VITALS
BODY MASS INDEX: 37.57 KG/M2 | HEIGHT: 61 IN | WEIGHT: 199 LBS | OXYGEN SATURATION: 99 % | HEART RATE: 70 BPM | SYSTOLIC BLOOD PRESSURE: 110 MMHG | DIASTOLIC BLOOD PRESSURE: 73 MMHG

## 2023-03-03 PROCEDURE — 99213 OFFICE O/P EST LOW 20 MIN: CPT

## 2023-03-03 NOTE — PHYSICAL EXAM
[Normal Breath Sounds] : Normal breath sounds [Normal Heart Sounds] : normal heart sounds [Normal Rate and Rhythm] : normal rate and rhythm [No HSM] : no hepatosplenomegaly [Calm] : calm [de-identified] : Well-appearing, no acute distress [de-identified] : JASSI [de-identified] : Soft, nondistended, nontender, incisions clean/dry/intact. [de-identified] : No CVA tenderness [de-identified] : Full range of motion

## 2023-03-03 NOTE — REVIEW OF SYSTEMS
[Fever] : no fever [Chills] : no chills [Eye Pain] : no eye pain [Red Eyes] : eyes not red [Earache] : no earache [Loss Of Hearing] : no hearing loss [Heart Rate Is Slow] : the heart rate was not slow [Heart Rate Is Fast] : the heart rate was not fast [Shortness Of Breath] : no shortness of breath [Wheezing] : no wheezing [Abdominal Pain] : no abdominal pain [Vomiting] : no vomiting [Dysuria] : no dysuria [Incontinence] : no incontinence [Joint Swelling] : no joint swelling [Joint Stiffness] : no joint stiffness [Skin Lesions] : no skin lesions [Skin Wound] : no skin wound [Confused] : no confusion [Convulsions] : no convulsions [Suicidal] : not suicidal [Sleep Disturbances] : no sleep disturbances

## 2023-03-03 NOTE — HISTORY OF PRESENT ILLNESS
[de-identified] : This patient is a pleasant 62-year-old female who is 2 weeks status post robotic incisional hernia repair and 3 months status post a gastric bypass for preoperative weight loss.  Overall she is doing quite well.  Her pain is improving slowly.  She has occasional discomfort in the lower abdomen.  Denies fever/chills.  She is eating well and having normal bowel function. [de-identified] : 12/12/22 Update:\par Patient presents for routine follow-up status post robotic incisional hernia repair as well as gastric bypass in June.  Overall she is doing very well and has no more abdominal pain or bulge.  She states that she has been losing weight and her current weight is 196 pounds.  She would like to get down to 150 pounds.  She states that she is engaging in minimal physical activity.  Labs showed mild vitamin D deficiency, otherwise normal.\par No fever/chills, dysphagia.  Normal bowel function.\par \par 2/13/23:\par Patient presents with new onset crampy abdominal pain that is worse at the end of the day.  She is tolerating adequate amount of regular foods.  She states she is drinking between 30 and 40 ounces of water per day.  She also states that when she wakes up she has nausea first thing in the morning.\par \par 3/3/2023:\par Returns after CT imaging. Hernia repair intact - small amt of seroma anterior to repair in the subq space. No abnormalities with bypass anatomy. No fistula demonstrated.

## 2023-03-10 NOTE — H&P PST ADULT - SOURCE OF INFORMATION, PROFILE
patient Tazorac Counseling:  Patient advised that medication is irritating and drying.  Patient may need to apply sparingly and wash off after an hour before eventually leaving it on overnight.  The patient verbalized understanding of the proper use and possible adverse effects of tazorac.  All of the patient's questions and concerns were addressed.

## 2023-04-25 NOTE — H&P PST ADULT - FUNCTIONAL ASSESSMENT - DAILY ACTIVITY PT AGE POP HIDDEN
Annual Visit     Patient Name: Geneva Haro  : 1966   MRN: [id number]   Care Team: Patient Care Team:  Cesar Gao MD as PCP - General (Internal Medicine)  Darion Figueroa MD as Consulting Physician (Gastroenterology)  Jazlyn Maguire APRN as Nurse Practitioner (Nurse Practitioner)    Chief Complaint:    Chief Complaint   Patient presents with   • Annual Exam       HPI: Geneva Haro is a 57 y.o. year old  presenting to be seen for her gynecologic exam.   Postmenopausal - no vaginal bldg in the last yr     Pap smear 2022 WNL and HPV negative      Hx bilateral breast cancer - diagnosed in 2015   S/p bilateral total mastectomy   States she had chemotherapy as well   Sees oncologist q 6 months now   She still has tenderness in the areas of scarring - states she was told that would never resolve   She does perform chest wall exams      DEXA 2022 osteopenia   Lowest t score -1.7   Takes calcium and vit d      Hx Gastric bypass x 2   She can't walk to exercise now, but she is very diligent about her diet to maintain her weight   Hx gastroparesis - has a gastric stimulator - this is listed as a pacemaker in her medical hx - she has no cardiac problems   Has colonoscopy scheduled next month   Planning another surgery with GI in the near future - she can't recall what it is called        Subjective      I have reviewed the patients family history, social history, past medical history, past surgical history and have updated it as appropriate.    /80   Resp 16   Wt 64 kg (141 lb)   BMI 22.08 kg/m²     BMI reviewed: Body mass index is 22.08 kg/m².      Objective     Physical Exam    Neuro: alert and oriented to person, place and time   General:  alert; cooperative; well developed; well nourished   Skin:  No suspicious lesions seen   Thyroid: normal to inspection and palpation   Lungs:  breathing is unlabored  clear to auscultation bilaterally   Heart:  regular rate and  rhythm, S1, S2 normal, no murmur, click, rub or gallop  normal apical impulse   Breasts:  Examined in supine position  Symmetric without masses or skin dimpling  There are no palpable axillary nodes  Mastectomy site well healed without palpable abnormalities  s/p bilateral total mastectomy    Abdomen: soft, non-tender; no masses  no umbilical or inguinal hernias are present  no hepato-splenomegaly   Pelvis: Clinical staff was present for exam  External genitalia:  normal appearance of the external genitalia including Bartholin's and Tyonek's glands.  :  urethral meatus normal;  Vaginal:  normal pink mucosa without prolapse or lesions.  Cervix:  normal appearance.  Uterus:  normal size, shape and consistency.  Adnexa:  normal bimanual exam of the adnexa.  Rectal:  digital rectal exam not performed; anus visually normal appearing.         Assessment / Plan      Assessment  Problems Addressed This Visit    ICD-10-CM ICD-9-CM   1. Encounter for gynecological examination without abnormal finding  Z01.419 V72.31   2. Postmenopausal  Z78.0 V49.81   3. Personal history of breast cancer  Z85.3 V10.3       Plan    Pap smear not indicated   Discussed monthly chest wall exams   Cont f/u with oncology q 6 months   Call with vaginal bldg    Reviewed DEXA report   Discussed Calcium, 600 mg/ Vit. D, 400 IU daily for bone health      AV 1 yr         40 to 64: Counseling/Anticipatory Guidance Discussed: injury prevention, screenings and self-breast exam    Follow Up  Return in about 1 year (around 4/25/2024) for Annual physical.  Patient was given instructions and counseling regarding her condition or for health maintenance advice. Please see specific information pulled into the AVS if appropriate.     Jazlyn Maguire, APRN  April 25, 2023  13:15 EDT     Adult

## 2023-06-12 ENCOUNTER — APPOINTMENT (OUTPATIENT)
Dept: SURGERY | Facility: CLINIC | Age: 63
End: 2023-06-12
Payer: COMMERCIAL

## 2023-06-12 VITALS
RESPIRATION RATE: 16 BRPM | BODY MASS INDEX: 37.76 KG/M2 | SYSTOLIC BLOOD PRESSURE: 119 MMHG | TEMPERATURE: 96.5 F | HEIGHT: 61 IN | DIASTOLIC BLOOD PRESSURE: 76 MMHG | WEIGHT: 200 LBS | OXYGEN SATURATION: 96 % | HEART RATE: 62 BPM

## 2023-06-12 PROCEDURE — 99213 OFFICE O/P EST LOW 20 MIN: CPT

## 2023-06-12 NOTE — HISTORY OF PRESENT ILLNESS
[de-identified] : This patient is a pleasant 62-year-old female who is 2 weeks status post robotic incisional hernia repair and 3 months status post a gastric bypass for preoperative weight loss.  Overall she is doing quite well.  Her pain is improving slowly.  She has occasional discomfort in the lower abdomen.  Denies fever/chills.  She is eating well and having normal bowel function. [de-identified] : 12/12/22 Update:\par Patient presents for routine follow-up status post robotic incisional hernia repair as well as gastric bypass in June.  Overall she is doing very well and has no more abdominal pain or bulge.  She states that she has been losing weight and her current weight is 196 pounds.  She would like to get down to 150 pounds.  She states that she is engaging in minimal physical activity.  Labs showed mild vitamin D deficiency, otherwise normal.\par No fever/chills, dysphagia.  Normal bowel function.\par \par 2/13/23:\par Patient presents with new onset crampy abdominal pain that is worse at the end of the day.  She is tolerating adequate amount of regular foods.  She states she is drinking between 30 and 40 ounces of water per day.  She also states that when she wakes up she has nausea first thing in the morning.\par \par 3/3/2023:\par Returns after CT imaging. Hernia repair intact - small amt of seroma anterior to repair in the subq space. No abnormalities with bypass anatomy. No fistula demonstrated.\par \par 6/12/23:\par Returns for ongoing follow-up. Overall well, but with increasing bulge in upper midline. Eating well, still has restriction. Weight stable but would like to lose more.

## 2023-06-12 NOTE — ASSESSMENT
[FreeTextEntry1] : 62-year-old female status post gastric bypass as well as robotic incisional hernia repair.  She is frustrated with her lack of recent weight loss despite healthy eating and physical activity.  Also reports increasing bulging in upper midline. Will refer to medical bariatrics and obtain Ct a/p to evaluate abdominal wall.

## 2023-06-12 NOTE — PHYSICAL EXAM
[Normal Breath Sounds] : Normal breath sounds [Normal Heart Sounds] : normal heart sounds [Normal Rate and Rhythm] : normal rate and rhythm [No HSM] : no hepatosplenomegaly [Calm] : calm [de-identified] : Well-appearing, no acute distress [de-identified] : JASSI [de-identified] : No CVA tenderness [de-identified] : Soft, nondistended, nontender, incisions clean/dry/intact. [de-identified] : Full range of motion

## 2023-06-24 ENCOUNTER — OUTPATIENT (OUTPATIENT)
Dept: OUTPATIENT SERVICES | Facility: HOSPITAL | Age: 63
LOS: 1 days | End: 2023-06-24
Payer: COMMERCIAL

## 2023-06-24 ENCOUNTER — APPOINTMENT (OUTPATIENT)
Dept: CT IMAGING | Facility: CLINIC | Age: 63
End: 2023-06-24
Payer: COMMERCIAL

## 2023-06-24 DIAGNOSIS — Z98.89 OTHER SPECIFIED POSTPROCEDURAL STATES: Chronic | ICD-10-CM

## 2023-06-24 DIAGNOSIS — Z98.890 OTHER SPECIFIED POSTPROCEDURAL STATES: Chronic | ICD-10-CM

## 2023-06-24 DIAGNOSIS — Z90.710 ACQUIRED ABSENCE OF BOTH CERVIX AND UTERUS: Chronic | ICD-10-CM

## 2023-06-24 DIAGNOSIS — K43.2 INCISIONAL HERNIA WITHOUT OBSTRUCTION OR GANGRENE: ICD-10-CM

## 2023-06-24 PROCEDURE — 74177 CT ABD & PELVIS W/CONTRAST: CPT | Mod: 26

## 2023-06-24 PROCEDURE — 74177 CT ABD & PELVIS W/CONTRAST: CPT

## 2023-07-07 NOTE — H&P PST ADULT - HISTORY OF COVID-19 VACCINATION
Discussed allery to meloxicam with Dr. Kinsey.   Patient should attempt medication and take OTC bendadry.  Discontinue if severe allergic reaction and seek emergent care      Yes

## 2023-07-13 NOTE — H&P PST ADULT - HISTORY OF PRESENT ILLNESS
July 13, 2023    [facility]  [address]  [place] [address]         [facility]  [address],   [place] [address]  Phone: [phone] July 13, 2023     Patient: Luz Elena Greenwood   YOB: 1972   Date of Visit: 7/13/2023       To Whom It May Concern:    It is my medical opinion that [facility] may return to work on August 1, 2023 .    If you have any questions or concerns, please don't hesitate to call.    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 patient was originally scheduled 5/3/2022 had positive covid pcr on 4/26/2022 at pcp office.    no nausea vomiting constipation, bulge at umbilicus, pressure 5/10 sitting makes it worse, no relief laying down.  omeprazole  com;eted for 8 weeks,  60 yo F PMH of hypothyroid, TARA on CPAP, GERD completed omeprazole 40 mg x 8 weeks with no significant change, obesity (BMI 45), s/p gastric sleeve 2016 lost 40 lbs, patient reports she tried weight watchers, , exercise over the years without meaningful success, 28 years ago craniotomy excision of acoustic neuroma, has weight in left upper eyelid, and hearing loss in left ear. Describes  mid gastric bulge and pressure, 5/10, sitting makes it worse, no relief noted when lying down, denies nausea, vomiting, or coughing. Presents for preop assessment prior to robotic gastric bypass, cholecystectomy, possible hiatal hernia repair w/Dr Glass scheduled for 6/8/2022.  Patient  was originally scheduled for this procedure on 5/3/2022, had positive covid pcr on 4/26/2022 at pcp office.    Clearance pending from Dr. 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 Patient presents for revision of gastric sleeve. Patient reports she tried weight watchers and exercise over the years without meaningful success. Reports a mid gastric bulge and pressure, 5/10 in severity, sitting makes it worse, no relief noted when lying down, denies nausea, vomiting, or coughing. Presents for preop assessment prior to robotic gastric bypass, cholecystectomy, possible hiatal hernia repair w/Dr Glass scheduled for 6/8/2022.  Patient  was originally scheduled for this procedure on 5/3/2022, had positive covid pcr on 4/26/2022 at pcp office, patient states she received monoclonal antibody infusion, fully recovered.    Medical evaluation pending from Dr. Vail

## 2023-08-10 NOTE — H&P PST ADULT - MALLAMPATI CLASS
Biopsy Type: H and E Class III - visualization of the soft palate and the base of the uvula left sided facial paralysis s/p surgery for acoustic neuroma removal, tongue deviates to right/Class IV (difficult) - the soft palate is not visible at all

## 2023-12-01 DIAGNOSIS — Z98.84 BARIATRIC SURGERY STATUS: ICD-10-CM

## 2023-12-04 ENCOUNTER — APPOINTMENT (OUTPATIENT)
Dept: SURGERY | Facility: CLINIC | Age: 63
End: 2023-12-04
Payer: COMMERCIAL

## 2023-12-04 ENCOUNTER — RESULT REVIEW (OUTPATIENT)
Age: 63
End: 2023-12-04

## 2023-12-04 VITALS
OXYGEN SATURATION: 100 % | DIASTOLIC BLOOD PRESSURE: 77 MMHG | RESPIRATION RATE: 14 BRPM | TEMPERATURE: 97.2 F | WEIGHT: 201.13 LBS | HEIGHT: 61 IN | HEART RATE: 73 BPM | SYSTOLIC BLOOD PRESSURE: 113 MMHG | BODY MASS INDEX: 37.97 KG/M2

## 2023-12-04 PROCEDURE — 99214 OFFICE O/P EST MOD 30 MIN: CPT

## 2023-12-13 ENCOUNTER — OUTPATIENT (OUTPATIENT)
Dept: OUTPATIENT SERVICES | Facility: HOSPITAL | Age: 63
LOS: 1 days | End: 2023-12-13
Payer: COMMERCIAL

## 2023-12-13 ENCOUNTER — APPOINTMENT (OUTPATIENT)
Dept: CT IMAGING | Facility: CLINIC | Age: 63
End: 2023-12-13
Payer: COMMERCIAL

## 2023-12-13 DIAGNOSIS — Z98.890 OTHER SPECIFIED POSTPROCEDURAL STATES: Chronic | ICD-10-CM

## 2023-12-13 DIAGNOSIS — Z98.89 OTHER SPECIFIED POSTPROCEDURAL STATES: Chronic | ICD-10-CM

## 2023-12-13 DIAGNOSIS — Z00.8 ENCOUNTER FOR OTHER GENERAL EXAMINATION: ICD-10-CM

## 2023-12-13 DIAGNOSIS — Z90.710 ACQUIRED ABSENCE OF BOTH CERVIX AND UTERUS: Chronic | ICD-10-CM

## 2023-12-13 PROCEDURE — 74176 CT ABD & PELVIS W/O CONTRAST: CPT

## 2023-12-13 PROCEDURE — 74176 CT ABD & PELVIS W/O CONTRAST: CPT | Mod: 26

## 2023-12-18 ENCOUNTER — APPOINTMENT (OUTPATIENT)
Dept: SURGERY | Facility: CLINIC | Age: 63
End: 2023-12-18
Payer: COMMERCIAL

## 2023-12-18 VITALS
HEIGHT: 61 IN | DIASTOLIC BLOOD PRESSURE: 71 MMHG | RESPIRATION RATE: 16 BRPM | SYSTOLIC BLOOD PRESSURE: 127 MMHG | WEIGHT: 203 LBS | TEMPERATURE: 98.3 F | OXYGEN SATURATION: 97 % | HEART RATE: 78 BPM | BODY MASS INDEX: 38.33 KG/M2

## 2023-12-18 PROCEDURE — 99214 OFFICE O/P EST MOD 30 MIN: CPT

## 2024-06-10 ENCOUNTER — APPOINTMENT (OUTPATIENT)
Dept: SURGERY | Facility: CLINIC | Age: 64
End: 2024-06-10
Payer: MEDICARE

## 2024-06-10 VITALS
HEIGHT: 61 IN | RESPIRATION RATE: 16 BRPM | BODY MASS INDEX: 38.21 KG/M2 | OXYGEN SATURATION: 95 % | DIASTOLIC BLOOD PRESSURE: 67 MMHG | HEART RATE: 80 BPM | TEMPERATURE: 97.9 F | SYSTOLIC BLOOD PRESSURE: 112 MMHG | WEIGHT: 202.38 LBS

## 2024-06-10 DIAGNOSIS — K43.2 INCISIONAL HERNIA W/OUT OBSTRUCTION OR GANGRENE: ICD-10-CM

## 2024-06-10 PROCEDURE — 99204 OFFICE O/P NEW MOD 45 MIN: CPT

## 2024-06-10 NOTE — HISTORY OF PRESENT ILLNESS
[de-identified] : This patient is a pleasant 62-year-old female who is 2 weeks status post robotic incisional hernia repair and 3 months status post a gastric bypass for preoperative weight loss.  Overall she is doing quite well.  Her pain is improving slowly.  She has occasional discomfort in the lower abdomen.  Denies fever/chills.  She is eating well and having normal bowel function. [de-identified] : 12/12/22 Update: Patient presents for routine follow-up status post robotic incisional hernia repair as well as gastric bypass in June.  Overall she is doing very well and has no more abdominal pain or bulge.  She states that she has been losing weight and her current weight is 196 pounds.  She would like to get down to 150 pounds.  She states that she is engaging in minimal physical activity.  Labs showed mild vitamin D deficiency, otherwise normal. No fever/chills, dysphagia.  Normal bowel function.  2/13/23: Patient presents with new onset crampy abdominal pain that is worse at the end of the day.  She is tolerating adequate amount of regular foods.  She states she is drinking between 30 and 40 ounces of water per day.  She also states that when she wakes up she has nausea first thing in the morning.  3/3/2023: Returns after CT imaging. Hernia repair intact - small amt of seroma anterior to repair in the subq space. No abnormalities with bypass anatomy. No fistula demonstrated.  6/12/23: Returns for ongoing follow-up. Overall well, but with increasing bulge in upper midline. Eating well, still has restriction. Weight stable but would like to lose more.  6/10/2024: Patient returns for ongoing follow-up.  She states that she is noticing increasing bulging in the upper midline at the site of the previous repair.  She also reports crampy, sometimes sharp abdominal pain lateral to the repair on the right and left side.  This is worsened after eating and during exertion.  Most recent CT scan was in December that showed eventration of the previous hernia mesh.

## 2024-06-10 NOTE — PHYSICAL EXAM
[Normal Breath Sounds] : Normal breath sounds [Normal Heart Sounds] : normal heart sounds [Normal Rate and Rhythm] : normal rate and rhythm [No HSM] : no hepatosplenomegaly [Calm] : calm [de-identified] : Well-appearing, no acute distress [de-identified] : JASSI [de-identified] : Soft, nondistended, nontender, incisions clean/dry/intact. [de-identified] : No CVA tenderness [de-identified] : Full range of motion

## 2024-06-10 NOTE — ASSESSMENT
[FreeTextEntry1] : 64-year-old female status post Denice-en-Y gastric bypass and subsequent recurrent incisional hernia repair performed as a intra-abdominal onlay procedure.  Based on her previous CT, the mesh is showing eventration/recurrence into the hernia sac which is likely contributing to her pain by pulling on the points of fixation on the left and right side.  I recommend a recurrent repair via a robotic totally extraperitoneal approach within the retrorectus space.  This will allow us to reapproximate the midline.

## 2024-06-20 NOTE — H&P PST ADULT - PROBLEM SELECTOR PLAN 4
20-Jun-2024 17:55 Continue to use CPAP, instructed to bring with her day of surgery. Scheduled robotic gastric bypass, cholecystectomy, possible hiatal hernia repair w/Dr Glass scheduled for 6/8/2022

## 2024-06-27 ENCOUNTER — OUTPATIENT (OUTPATIENT)
Dept: OUTPATIENT SERVICES | Facility: HOSPITAL | Age: 64
LOS: 1 days | End: 2024-06-27
Payer: MEDICARE

## 2024-06-27 VITALS
WEIGHT: 201.72 LBS | TEMPERATURE: 97 F | OXYGEN SATURATION: 97 % | HEIGHT: 62 IN | HEART RATE: 71 BPM | DIASTOLIC BLOOD PRESSURE: 72 MMHG | SYSTOLIC BLOOD PRESSURE: 134 MMHG

## 2024-06-27 DIAGNOSIS — Z29.9 ENCOUNTER FOR PROPHYLACTIC MEASURES, UNSPECIFIED: ICD-10-CM

## 2024-06-27 DIAGNOSIS — Z90.710 ACQUIRED ABSENCE OF BOTH CERVIX AND UTERUS: Chronic | ICD-10-CM

## 2024-06-27 DIAGNOSIS — Z98.89 OTHER SPECIFIED POSTPROCEDURAL STATES: Chronic | ICD-10-CM

## 2024-06-27 DIAGNOSIS — E03.9 HYPOTHYROIDISM, UNSPECIFIED: ICD-10-CM

## 2024-06-27 DIAGNOSIS — Z01.818 ENCOUNTER FOR OTHER PREPROCEDURAL EXAMINATION: ICD-10-CM

## 2024-06-27 DIAGNOSIS — Z98.84 BARIATRIC SURGERY STATUS: Chronic | ICD-10-CM

## 2024-06-27 DIAGNOSIS — Z98.890 OTHER SPECIFIED POSTPROCEDURAL STATES: Chronic | ICD-10-CM

## 2024-06-27 DIAGNOSIS — K43.2 INCISIONAL HERNIA WITHOUT OBSTRUCTION OR GANGRENE: ICD-10-CM

## 2024-06-27 LAB
A1C WITH ESTIMATED AVERAGE GLUCOSE RESULT: 6 % — HIGH (ref 4–5.6)
ANION GAP SERPL CALC-SCNC: 11 MMOL/L — SIGNIFICANT CHANGE UP (ref 5–17)
BASOPHILS # BLD AUTO: 0.1 K/UL — SIGNIFICANT CHANGE UP (ref 0–0.2)
BASOPHILS NFR BLD AUTO: 1.7 % — SIGNIFICANT CHANGE UP (ref 0–2)
BLD GP AB SCN SERPL QL: SIGNIFICANT CHANGE UP
BUN SERPL-MCNC: 10.9 MG/DL — SIGNIFICANT CHANGE UP (ref 8–20)
CALCIUM SERPL-MCNC: 9.1 MG/DL — SIGNIFICANT CHANGE UP (ref 8.4–10.5)
CHLORIDE SERPL-SCNC: 106 MMOL/L — SIGNIFICANT CHANGE UP (ref 96–108)
CO2 SERPL-SCNC: 22 MMOL/L — SIGNIFICANT CHANGE UP (ref 22–29)
CREAT SERPL-MCNC: 0.54 MG/DL — SIGNIFICANT CHANGE UP (ref 0.5–1.3)
EGFR: 102 ML/MIN/1.73M2 — SIGNIFICANT CHANGE UP
EOSINOPHIL # BLD AUTO: 0.37 K/UL — SIGNIFICANT CHANGE UP (ref 0–0.5)
EOSINOPHIL NFR BLD AUTO: 6.5 % — HIGH (ref 0–6)
ESTIMATED AVERAGE GLUCOSE: 126 MG/DL — HIGH (ref 68–114)
GLUCOSE SERPL-MCNC: 94 MG/DL — SIGNIFICANT CHANGE UP (ref 70–99)
HCT VFR BLD CALC: 40.5 % — SIGNIFICANT CHANGE UP (ref 34.5–45)
HGB BLD-MCNC: 13.5 G/DL — SIGNIFICANT CHANGE UP (ref 11.5–15.5)
IMM GRANULOCYTES NFR BLD AUTO: 0 % — SIGNIFICANT CHANGE UP (ref 0–0.9)
LYMPHOCYTES # BLD AUTO: 2.01 K/UL — SIGNIFICANT CHANGE UP (ref 1–3.3)
LYMPHOCYTES # BLD AUTO: 35.1 % — SIGNIFICANT CHANGE UP (ref 13–44)
MCHC RBC-ENTMCNC: 30.3 PG — SIGNIFICANT CHANGE UP (ref 27–34)
MCHC RBC-ENTMCNC: 33.3 GM/DL — SIGNIFICANT CHANGE UP (ref 32–36)
MCV RBC AUTO: 90.8 FL — SIGNIFICANT CHANGE UP (ref 80–100)
MONOCYTES # BLD AUTO: 0.67 K/UL — SIGNIFICANT CHANGE UP (ref 0–0.9)
MONOCYTES NFR BLD AUTO: 11.7 % — SIGNIFICANT CHANGE UP (ref 2–14)
MRSA PCR RESULT.: SIGNIFICANT CHANGE UP
NEUTROPHILS # BLD AUTO: 2.58 K/UL — SIGNIFICANT CHANGE UP (ref 1.8–7.4)
NEUTROPHILS NFR BLD AUTO: 45 % — SIGNIFICANT CHANGE UP (ref 43–77)
PLATELET # BLD AUTO: 281 K/UL — SIGNIFICANT CHANGE UP (ref 150–400)
POTASSIUM SERPL-MCNC: 4.3 MMOL/L — SIGNIFICANT CHANGE UP (ref 3.5–5.3)
POTASSIUM SERPL-SCNC: 4.3 MMOL/L — SIGNIFICANT CHANGE UP (ref 3.5–5.3)
RBC # BLD: 4.46 M/UL — SIGNIFICANT CHANGE UP (ref 3.8–5.2)
RBC # FLD: 13.1 % — SIGNIFICANT CHANGE UP (ref 10.3–14.5)
S AUREUS DNA NOSE QL NAA+PROBE: DETECTED
SODIUM SERPL-SCNC: 139 MMOL/L — SIGNIFICANT CHANGE UP (ref 135–145)
WBC # BLD: 5.73 K/UL — SIGNIFICANT CHANGE UP (ref 3.8–10.5)
WBC # FLD AUTO: 5.73 K/UL — SIGNIFICANT CHANGE UP (ref 3.8–10.5)

## 2024-06-27 PROCEDURE — 80048 BASIC METABOLIC PNL TOTAL CA: CPT

## 2024-06-27 PROCEDURE — 36415 COLL VENOUS BLD VENIPUNCTURE: CPT

## 2024-06-27 PROCEDURE — 93010 ELECTROCARDIOGRAM REPORT: CPT

## 2024-06-27 PROCEDURE — 86901 BLOOD TYPING SEROLOGIC RH(D): CPT

## 2024-06-27 PROCEDURE — 87640 STAPH A DNA AMP PROBE: CPT

## 2024-06-27 PROCEDURE — 83036 HEMOGLOBIN GLYCOSYLATED A1C: CPT

## 2024-06-27 PROCEDURE — 86850 RBC ANTIBODY SCREEN: CPT

## 2024-06-27 PROCEDURE — 86900 BLOOD TYPING SEROLOGIC ABO: CPT

## 2024-06-27 PROCEDURE — 85025 COMPLETE CBC W/AUTO DIFF WBC: CPT

## 2024-06-27 PROCEDURE — 93005 ELECTROCARDIOGRAM TRACING: CPT

## 2024-06-27 PROCEDURE — G0463: CPT

## 2024-06-27 PROCEDURE — 87641 MR-STAPH DNA AMP PROBE: CPT

## 2024-06-27 RX ORDER — MUPIROCIN 20 MG/G
1 OINTMENT TOPICAL
Qty: 1 | Refills: 0
Start: 2024-06-27 | End: 2024-07-01

## 2024-06-27 RX ORDER — LEVOTHYROXINE SODIUM 125 MCG
1 TABLET ORAL
Qty: 0 | Refills: 0 | DISCHARGE

## 2024-06-27 RX ORDER — BUPIVACAINE 13.3 MG/ML
20 INJECTION, SUSPENSION, LIPOSOMAL INFILTRATION ONCE
Refills: 0 | Status: DISCONTINUED | OUTPATIENT
Start: 2024-07-09 | End: 2024-07-09

## 2024-06-27 RX ORDER — CEFAZOLIN 10 G/1
2000 INJECTION, POWDER, FOR SOLUTION INTRAVENOUS ONCE
Refills: 0 | Status: DISCONTINUED | OUTPATIENT
Start: 2024-07-09 | End: 2024-07-09

## 2024-07-08 ENCOUNTER — TRANSCRIPTION ENCOUNTER (OUTPATIENT)
Age: 64
End: 2024-07-08

## 2024-07-09 ENCOUNTER — APPOINTMENT (OUTPATIENT)
Dept: SURGERY | Facility: HOSPITAL | Age: 64
End: 2024-07-09

## 2024-07-09 ENCOUNTER — TRANSCRIPTION ENCOUNTER (OUTPATIENT)
Age: 64
End: 2024-07-09

## 2024-07-09 ENCOUNTER — INPATIENT (INPATIENT)
Facility: HOSPITAL | Age: 64
LOS: 0 days | Discharge: HOME | DRG: 328 | End: 2024-07-09
Attending: SURGERY | Admitting: SURGERY
Payer: MEDICARE

## 2024-07-09 VITALS
WEIGHT: 201.72 LBS | SYSTOLIC BLOOD PRESSURE: 134 MMHG | DIASTOLIC BLOOD PRESSURE: 74 MMHG | TEMPERATURE: 98 F | RESPIRATION RATE: 16 BRPM | OXYGEN SATURATION: 97 % | HEART RATE: 68 BPM | HEIGHT: 62 IN

## 2024-07-09 VITALS
DIASTOLIC BLOOD PRESSURE: 61 MMHG | RESPIRATION RATE: 15 BRPM | SYSTOLIC BLOOD PRESSURE: 132 MMHG | HEART RATE: 84 BPM | TEMPERATURE: 97 F | OXYGEN SATURATION: 94 %

## 2024-07-09 DIAGNOSIS — Z98.890 OTHER SPECIFIED POSTPROCEDURAL STATES: Chronic | ICD-10-CM

## 2024-07-09 DIAGNOSIS — Z98.89 UNDEFINED: Chronic | ICD-10-CM

## 2024-07-09 DIAGNOSIS — Z90.710 ACQUIRED ABSENCE OF BOTH CERVIX AND UTERUS: Chronic | ICD-10-CM

## 2024-07-09 DIAGNOSIS — Z98.84 BARIATRIC SURGERY STATUS: Chronic | ICD-10-CM

## 2024-07-09 DIAGNOSIS — K43.2 INCISIONAL HERNIA WITHOUT OBSTRUCTION OR GANGRENE: ICD-10-CM

## 2024-07-09 PROCEDURE — C1781: CPT

## 2024-07-09 PROCEDURE — 15734 MUSCLE-SKIN GRAFT TRUNK: CPT

## 2024-07-09 PROCEDURE — S2900: CPT

## 2024-07-09 PROCEDURE — C9399: CPT

## 2024-07-09 PROCEDURE — S2900 ROBOTIC SURGICAL SYSTEM: CPT | Mod: NC

## 2024-07-09 PROCEDURE — 14301 TIS TRNFR ANY 30.1-60 SQ CM: CPT

## 2024-07-09 PROCEDURE — 49617 RPR AA HRN RCR > 10 RDC: CPT | Mod: AS,22

## 2024-07-09 PROCEDURE — 49617 RPR AA HRN RCR > 10 RDC: CPT

## 2024-07-09 PROCEDURE — 14302 TIS TRNFR ADDL 30 SQ CM: CPT

## 2024-07-09 PROCEDURE — 36415 COLL VENOUS BLD VENIPUNCTURE: CPT

## 2024-07-09 PROCEDURE — 15734 MUSCLE-SKIN GRAFT TRUNK: CPT | Mod: AS,59

## 2024-07-09 PROCEDURE — 49596 RPR AA HRN 1ST > 10 NCR/STRN: CPT | Mod: AS,22

## 2024-07-09 PROCEDURE — 49596 RPR AA HRN 1ST > 10 NCR/STRN: CPT | Mod: 22

## 2024-07-09 DEVICE — MESH VERSATEX 20X20CM: Type: IMPLANTABLE DEVICE | Status: FUNCTIONAL

## 2024-07-09 RX ORDER — ONDANSETRON HYDROCHLORIDE 2 MG/ML
4 INJECTION INTRAMUSCULAR; INTRAVENOUS ONCE
Refills: 0 | Status: DISCONTINUED | OUTPATIENT
Start: 2024-07-09 | End: 2024-07-09

## 2024-07-09 RX ORDER — FENTANYL CITRATE 50 UG/ML
50 INJECTION, SOLUTION INTRAMUSCULAR; INTRAVENOUS
Refills: 0 | Status: DISCONTINUED | OUTPATIENT
Start: 2024-07-09 | End: 2024-07-09

## 2024-07-09 RX ORDER — ACETAMINOPHEN 325 MG
975 TABLET ORAL ONCE
Refills: 0 | Status: COMPLETED | OUTPATIENT
Start: 2024-07-09 | End: 2024-07-09

## 2024-07-09 RX ORDER — DEXTROSE MONOHYDRATE AND SODIUM CHLORIDE 5; .3 G/100ML; G/100ML
1000 INJECTION, SOLUTION INTRAVENOUS
Refills: 0 | Status: DISCONTINUED | OUTPATIENT
Start: 2024-07-09 | End: 2024-07-09

## 2024-07-09 RX ADMIN — Medication 975 MILLIGRAM(S): at 06:05

## 2024-07-09 RX ADMIN — FENTANYL CITRATE 50 MICROGRAM(S): 50 INJECTION, SOLUTION INTRAMUSCULAR; INTRAVENOUS at 11:08

## 2024-07-09 NOTE — ASU DISCHARGE PLAN (ADULT/PEDIATRIC) - ASU DC SPECIAL INSTRUCTIONSFT
WOUND CARE: Do not peel off dermabond( purple glue) it will fall off on its own.   BATHING: Please do not submerge wound underwater. You may shower and/or sponge bathe.   ACTIVITY: No heavy lifting or straining. Otherwise, you may return to your usual level of physical activity. If you are taking narcotic pain medication (such as Percocet) DO NOT drive a car, operate machinery or make important decisions.  DIET: Return to your usual diet.  NOTIFY YOUR SURGEON IF: You have any bleeding that does not stop, any pus draining from your wound(s), any fever (over 100.4 F) or chills, persistent nausea/vomiting, persistent diarrhea, or if your pain is not controlled.   FOLLOW-UP: Please follow up with Dr. Glass in 2 weeks regarding your surgery. Call for appointment upon discharge.  You may take tylenol and/or motrin for pain.

## 2024-07-09 NOTE — ASU DISCHARGE PLAN (ADULT/PEDIATRIC) - CARE PROVIDER_API CALL
Morgan Glass [facility]gery  [address] [place] [address]  Phone: [phone]  Fax: [phone]  Follow Up Time:    Morgan Glass  Surgery  [address] [place] [address]  Phone: [phone]  Fax: [phone]  Follow Up Time: 2 weeks

## 2024-07-09 NOTE — BRIEF OPERATIVE NOTE - OPERATION/FINDINGS
Robotic assisted ventral hernia repair, Extraperitoneal approach. large ventral hernia. versatex mesh used 18x18 cm.

## 2024-07-09 NOTE — BRIEF OPERATIVE NOTE - FIRST ASSIST NAME
July 8, 2018      Ulices Gaitan MD  [address] [facility] [address] [place] [address]           [facility]  [address] [facility] [address] [place] [address]  Phone: [phone]          Patient: Amanda Smith   MR Number: [id number]   YOB: 1953   Date of Visit: 6/27/2018       Dear Dr. Ulices Gaitan:    Thank you for referring Amanda Smith to me for evaluation. Attached you will find relevant portions of my assessment and plan of care.    If you have questions, please do not hesitate to call me. I look forward to following Amanda Smith along with you.    Sincerely,    Ananda King MD    Enclosure  CC:  No Recipients    If you would like to receive this communication electronically, please contact [email or web address] or [phone] to request more information on [employer] Link access.    For providers and/or their staff who would like to refer a patient to [facility], please contact us through our one-stop-shop provider referral line, [facility], [place], at [phone].    If you feel you have received this communication in error or would no longer like to receive these types of communications, please e-mail [email or web address]          Chasity Pete (Physician Assistant)

## 2024-07-09 NOTE — BRIEF OPERATIVE NOTE - NSICDXBRIEFPROCEDURE_GEN_ALL_CORE_FT
23-Oct-2020 15:29 PROCEDURES:  Laparoscopic repair of hernia by total extraperitoneal approach 09-Jul-2024 11:04:20  Chasity Pete

## 2024-07-16 ENCOUNTER — APPOINTMENT (OUTPATIENT)
Dept: SURGERY | Facility: CLINIC | Age: 64
End: 2024-07-16
Payer: MEDICARE

## 2024-07-16 VITALS
TEMPERATURE: 97.9 F | HEIGHT: 61 IN | DIASTOLIC BLOOD PRESSURE: 71 MMHG | OXYGEN SATURATION: 96 % | RESPIRATION RATE: 16 BRPM | HEART RATE: 74 BPM | BODY MASS INDEX: 38.73 KG/M2 | WEIGHT: 205.13 LBS | SYSTOLIC BLOOD PRESSURE: 113 MMHG

## 2024-07-16 DIAGNOSIS — L30.9 DERMATITIS, UNSPECIFIED: ICD-10-CM

## 2024-07-16 PROBLEM — K43.2 INCISIONAL HERNIA WITHOUT OBSTRUCTION OR GANGRENE: Chronic | Status: ACTIVE | Noted: 2024-06-27

## 2024-07-16 PROCEDURE — 99214 OFFICE O/P EST MOD 30 MIN: CPT

## 2024-07-16 RX ORDER — MOMETASONE FUROATE 1 MG/G
0.1 CREAM TOPICAL TWICE DAILY
Qty: 2 | Refills: 0 | Status: ACTIVE | COMMUNITY
Start: 2024-07-16 | End: 1900-01-01

## 2024-07-22 ENCOUNTER — APPOINTMENT (OUTPATIENT)
Dept: SURGERY | Facility: CLINIC | Age: 64
End: 2024-07-22
Payer: MEDICARE

## 2024-07-22 VITALS
BODY MASS INDEX: 39.36 KG/M2 | OXYGEN SATURATION: 98 % | RESPIRATION RATE: 16 BRPM | HEART RATE: 64 BPM | WEIGHT: 208.5 LBS | DIASTOLIC BLOOD PRESSURE: 79 MMHG | HEIGHT: 61 IN | TEMPERATURE: 98 F | SYSTOLIC BLOOD PRESSURE: 120 MMHG

## 2024-07-22 DIAGNOSIS — K43.2 INCISIONAL HERNIA W/OUT OBSTRUCTION OR GANGRENE: ICD-10-CM

## 2024-07-22 PROCEDURE — 99024 POSTOP FOLLOW-UP VISIT: CPT

## 2024-07-22 NOTE — PHYSICAL EXAM
[Normal Breath Sounds] : Normal breath sounds [Normal Heart Sounds] : normal heart sounds [Normal Rate and Rhythm] : normal rate and rhythm [No HSM] : no hepatosplenomegaly [Calm] : calm [de-identified] : Well-appearing, no acute distress [de-identified] : JASSI [de-identified] : Soft, nondistended, nontender, incisions clean/dry/intact. no recurrence noted. [de-identified] : No CVA tenderness [de-identified] : Full range of motion

## 2024-07-22 NOTE — HISTORY OF PRESENT ILLNESS
[de-identified] : This patient is a pleasant 62-year-old female who is 2 weeks status post robotic incisional hernia repair and 3 months status post a gastric bypass for preoperative weight loss.  Overall she is doing quite well.  Her pain is improving slowly.  She has occasional discomfort in the lower abdomen.  Denies fever/chills.  She is eating well and having normal bowel function. [de-identified] : 12/12/22 Update: Patient presents for routine follow-up status post robotic incisional hernia repair as well as gastric bypass in June.  Overall she is doing very well and has no more abdominal pain or bulge.  She states that she has been losing weight and her current weight is 196 pounds.  She would like to get down to 150 pounds.  She states that she is engaging in minimal physical activity.  Labs showed mild vitamin D deficiency, otherwise normal. No fever/chills, dysphagia.  Normal bowel function.  2/13/23: Patient presents with new onset crampy abdominal pain that is worse at the end of the day.  She is tolerating adequate amount of regular foods.  She states she is drinking between 30 and 40 ounces of water per day.  She also states that when she wakes up she has nausea first thing in the morning.  3/3/2023: Returns after CT imaging. Hernia repair intact - small amt of seroma anterior to repair in the subq space. No abnormalities with bypass anatomy. No fistula demonstrated.  6/12/23: Returns for ongoing follow-up. Overall well, but with increasing bulge in upper midline. Eating well, still has restriction. Weight stable but would like to lose more.  6/10/2024: Patient returns for ongoing follow-up.  She states that she is noticing increasing bulging in the upper midline at the site of the previous repair.  She also reports crampy, sometimes sharp abdominal pain lateral to the repair on the right and left side.  This is worsened after eating and during exertion.  Most recent CT scan was in December that showed eventration of the previous hernia mesh.  7/22/24: Returns for postop followup s/po robotic eTEP recurrent incisional hernia repair. Doing well, no fevers. Had allergic reaction last week of unknown cause. now resolved. Eating well.

## 2024-07-22 NOTE — ASU PATIENT PROFILE, ADULT - NS PRO LACT YNNA
Large Joint Aspiration/Injection: R knee    Date/Time: 7/18/2024 3:00 PM    Performed by: Terri Quiroga FNP  Authorized by: Terri Quiroga FNP    Consent Done?:  Yes (Verbal)  Indications:  Pain  Timeout: prior to procedure the correct patient, procedure, and site was verified    Prep: patient was prepped and draped in usual sterile fashion    Local anesthetic:  Lidocaine 1% without epinephrine  Anesthetic total (ml):  5      Details:  Needle Size:  21 G  Approach:  Anterolateral  Location:  Knee  Site:  R knee  Medications:  40 mg triamcinolone acetonide 40 mg/mL  Patient tolerance:  Patient tolerated the procedure well with no immediate complications     no

## 2024-07-22 NOTE — ASSESSMENT
[FreeTextEntry1] : 64-year-old female status post Denice-en-Y gastric bypass and subsequent recurrent incisional hernia repair performed as a intra-abdominal onlay procedure.  Now s/p robotic repair of recurrence. Doing well. Follow-up in December for yearly bariatric follow-up.

## 2024-12-23 ENCOUNTER — APPOINTMENT (OUTPATIENT)
Dept: SURGERY | Facility: CLINIC | Age: 64
End: 2024-12-23
Payer: MEDICARE

## 2024-12-23 VITALS
HEART RATE: 67 BPM | SYSTOLIC BLOOD PRESSURE: 111 MMHG | TEMPERATURE: 97.9 F | OXYGEN SATURATION: 96 % | DIASTOLIC BLOOD PRESSURE: 71 MMHG | WEIGHT: 210 LBS | HEIGHT: 60 IN | RESPIRATION RATE: 16 BRPM | BODY MASS INDEX: 41.23 KG/M2

## 2024-12-23 DIAGNOSIS — K43.2 INCISIONAL HERNIA W/OUT OBSTRUCTION OR GANGRENE: ICD-10-CM

## 2024-12-23 DIAGNOSIS — Z98.84 BARIATRIC SURGERY STATUS: ICD-10-CM

## 2024-12-23 PROCEDURE — 99214 OFFICE O/P EST MOD 30 MIN: CPT

## 2025-01-06 DIAGNOSIS — Z98.84 BARIATRIC SURGERY STATUS: ICD-10-CM

## 2025-01-30 DIAGNOSIS — E55.9 VITAMIN D DEFICIENCY, UNSPECIFIED: ICD-10-CM

## 2025-01-30 RX ORDER — ERGOCALCIFEROL 1.25 MG/1
50000 CAPSULE ORAL
Qty: 12 | Refills: 0 | Status: ACTIVE | COMMUNITY
Start: 2025-01-30 | End: 1900-01-01

## 2025-01-31 ENCOUNTER — APPOINTMENT (OUTPATIENT)
Dept: PULMONOLOGY | Facility: CLINIC | Age: 65
End: 2025-01-31
Payer: MEDICARE

## 2025-01-31 VITALS
SYSTOLIC BLOOD PRESSURE: 109 MMHG | OXYGEN SATURATION: 97 % | HEART RATE: 70 BPM | WEIGHT: 210 LBS | HEIGHT: 60 IN | RESPIRATION RATE: 16 BRPM | BODY MASS INDEX: 41.23 KG/M2 | DIASTOLIC BLOOD PRESSURE: 70 MMHG

## 2025-01-31 DIAGNOSIS — G47.00 INSOMNIA, UNSPECIFIED: ICD-10-CM

## 2025-01-31 DIAGNOSIS — R68.82 DECREASED LIBIDO: ICD-10-CM

## 2025-01-31 DIAGNOSIS — G47.33 OBSTRUCTIVE SLEEP APNEA (ADULT) (PEDIATRIC): ICD-10-CM

## 2025-01-31 PROCEDURE — G2211 COMPLEX E/M VISIT ADD ON: CPT

## 2025-01-31 PROCEDURE — 99204 OFFICE O/P NEW MOD 45 MIN: CPT

## 2025-06-25 ENCOUNTER — APPOINTMENT (OUTPATIENT)
Dept: PULMONOLOGY | Facility: CLINIC | Age: 65
End: 2025-06-25
Payer: MEDICARE

## 2025-06-25 VITALS
RESPIRATION RATE: 16 BRPM | DIASTOLIC BLOOD PRESSURE: 72 MMHG | BODY MASS INDEX: 42.01 KG/M2 | HEART RATE: 68 BPM | OXYGEN SATURATION: 97 % | HEIGHT: 60 IN | WEIGHT: 214 LBS | SYSTOLIC BLOOD PRESSURE: 130 MMHG

## 2025-06-25 PROCEDURE — G2211 COMPLEX E/M VISIT ADD ON: CPT

## 2025-06-25 PROCEDURE — 99214 OFFICE O/P EST MOD 30 MIN: CPT

## 2025-06-30 ENCOUNTER — APPOINTMENT (OUTPATIENT)
Dept: SURGERY | Facility: CLINIC | Age: 65
End: 2025-06-30
Payer: MEDICARE

## 2025-06-30 VITALS
HEART RATE: 71 BPM | DIASTOLIC BLOOD PRESSURE: 72 MMHG | WEIGHT: 215.4 LBS | HEIGHT: 60 IN | RESPIRATION RATE: 16 BRPM | OXYGEN SATURATION: 95 % | SYSTOLIC BLOOD PRESSURE: 117 MMHG | BODY MASS INDEX: 42.29 KG/M2 | TEMPERATURE: 98 F

## 2025-06-30 PROCEDURE — 99214 OFFICE O/P EST MOD 30 MIN: CPT

## 2025-07-18 ENCOUNTER — APPOINTMENT (OUTPATIENT)
Dept: FAMILY MEDICINE | Facility: CLINIC | Age: 65
End: 2025-07-18
Payer: MEDICARE

## 2025-07-18 VITALS — BODY MASS INDEX: 42.21 KG/M2 | HEIGHT: 60 IN | WEIGHT: 215 LBS

## 2025-07-18 PROCEDURE — 99205 OFFICE O/P NEW HI 60 MIN: CPT | Mod: 2W

## 2025-07-23 ENCOUNTER — TRANSCRIPTION ENCOUNTER (OUTPATIENT)
Age: 65
End: 2025-07-23

## 2025-07-23 RX ORDER — METFORMIN HYDROCHLORIDE 500 MG/1
500 TABLET, COATED ORAL TWICE DAILY
Qty: 60 | Refills: 1 | Status: ACTIVE | COMMUNITY
Start: 2025-07-23 | End: 1900-01-01

## 2025-07-25 RX ORDER — TIRZEPATIDE 2.5 MG/.5ML
2.5 INJECTION, SOLUTION SUBCUTANEOUS
Qty: 1 | Refills: 0 | Status: ACTIVE | COMMUNITY
Start: 2025-07-18

## 2025-08-27 ENCOUNTER — APPOINTMENT (OUTPATIENT)
Dept: FAMILY MEDICINE | Facility: CLINIC | Age: 65
End: 2025-08-27
Payer: MEDICARE

## 2025-08-27 VITALS — WEIGHT: 213 LBS | HEIGHT: 60 IN | BODY MASS INDEX: 41.82 KG/M2

## 2025-08-27 DIAGNOSIS — E03.9 HYPOTHYROIDISM, UNSPECIFIED: ICD-10-CM

## 2025-08-27 DIAGNOSIS — G47.33 OBSTRUCTIVE SLEEP APNEA (ADULT) (PEDIATRIC): ICD-10-CM

## 2025-08-27 DIAGNOSIS — Z90.3 ACQUIRED ABSENCE OF STOMACH [PART OF]: ICD-10-CM

## 2025-08-27 PROCEDURE — 99214 OFFICE O/P EST MOD 30 MIN: CPT | Mod: 2W

## (undated) DEVICE — VENODYNE/SCD SLEEVE CALF MEDIUM

## (undated) DEVICE — SI DRAPE ARM

## (undated) DEVICE — ELCTR GROUNDING PAD ADULT COVIDIEN

## (undated) DEVICE — PACK ROBOTIC

## (undated) DEVICE — D HELP - CLEARVIEW CLEARIFY SYSTEM

## (undated) DEVICE — XI SEAL UNIV 5- 8 MM

## (undated) DEVICE — NDL COVIDIEN 14G INSUFFLATION NEEDLE

## (undated) DEVICE — IRRIGATION TRAY W PISTON SYRINGE 60ML

## (undated) DEVICE — XI OBTURATOR OPTICAL BLADELESS 8MM

## (undated) DEVICE — DRAPE TOWEL BLUE STICKY

## (undated) DEVICE — TROCAR COVIDIEN VERSAPORT BLADELESS OPTICAL 5MM STANDARD

## (undated) DEVICE — DRAPE MAJOR ABDOMINAL W POUCHES

## (undated) DEVICE — SUT MONOCRYL 4-0 27" PS-2 UNDYED

## (undated) DEVICE — XI STAPLER SUREFORM 60

## (undated) DEVICE — ELCTR CORD FOOTSWITCH 1PLR LAPSCP 10FT

## (undated) DEVICE — POSITIONER FOAM EGG CRATE ULNAR (2PCS)

## (undated) DEVICE — PACK ROBOTIC LIJ

## (undated) DEVICE — XI ARM SCISSOR MONO CURVED

## (undated) DEVICE — XI DRAPE COLUMN

## (undated) DEVICE — DRAPE SHEET XL 77X98"

## (undated) DEVICE — SUT VICRYL 0 27" UR-6

## (undated) DEVICE — XI DRAPE ARM

## (undated) DEVICE — SUT VLOC 180 3-0 6" V-20 GREEN

## (undated) DEVICE — XI CORD BIPOLAR CAUTERY (BLUE)

## (undated) DEVICE — SUT BIOSYN 4-0 18" P-12

## (undated) DEVICE — DRAPE TOWEL BLUE 17" X 24"

## (undated) DEVICE — PREP CHLORAPREP ORANGE 2PCT 26ML

## (undated) DEVICE — WRAP COMPRESSION CALF LG

## (undated) DEVICE — DRSG STERISTRIPS 0.5X4"

## (undated) DEVICE — XI GRASPER TIP UP FENESTRATED

## (undated) DEVICE — XI TIP COVER

## (undated) DEVICE — XI ARM FORCEP PROGRASP 8MM

## (undated) DEVICE — XI CORD MONOPOLAR CAUTERY (GREEN)

## (undated) DEVICE — GOWN TRIMAX LG

## (undated) DEVICE — VISIGI 3D SLEEVE GASTRECTOMY 36FR

## (undated) DEVICE — ELCTR BOVIE PENCIL SMOKE EVACUATION 15FT

## (undated) DEVICE — XI SEAL UNIVERSIAL 5-12MM

## (undated) DEVICE — SOL IRR POUR NS 0.9% 1000ML

## (undated) DEVICE — SUT POLYSORB 0 30" GS-23

## (undated) DEVICE — DRAPE XL SHEET 77X98"

## (undated) DEVICE — DRAPE GENERAL ENDOSCOPY

## (undated) DEVICE — TUBING INSUFFLATION LAP FILTER 10FT

## (undated) DEVICE — BLANKET WARMER UPPER ADULT

## (undated) DEVICE — COVER TIP INTUITIVE W INSERTION TOOL

## (undated) DEVICE — XI SEALER DA VINCI VESSEL

## (undated) DEVICE — NDL HYPO SAFE 22G X 1.5"

## (undated) DEVICE — SOL IRR POUR H2O 1000ML

## (undated) DEVICE — FORCEP RADIAL JAW 4 W NDL 2.4MM 2.8MM 240CM ORANGE DISP

## (undated) DEVICE — BITE BLOCK MOUTHPCW/STRAP

## (undated) DEVICE — XI ARM NEEDLE DRIVER SUTURECUT MEGA 8MM

## (undated) DEVICE — DRSG MASTISOL

## (undated) DEVICE — CONTAINER SPECIMEN 100ML

## (undated) DEVICE — SUT ETHIBOND 0 30" SH

## (undated) DEVICE — SUT POLYSORB 2-0 30" V-20 UNDYED

## (undated) DEVICE — GLV 8 PROTEXIS

## (undated) DEVICE — STAPLER SKIN PROXIMATE

## (undated) DEVICE — VALVE ENDOSCOPE DEFENDO SINGLE USE

## (undated) DEVICE — SUT POLYSORB 3-0 30" V-20 UNDYED